# Patient Record
Sex: FEMALE | Race: WHITE | NOT HISPANIC OR LATINO | Employment: UNEMPLOYED | URBAN - METROPOLITAN AREA
[De-identification: names, ages, dates, MRNs, and addresses within clinical notes are randomized per-mention and may not be internally consistent; named-entity substitution may affect disease eponyms.]

---

## 2017-08-21 ENCOUNTER — APPOINTMENT (OUTPATIENT)
Dept: RADIOLOGY | Facility: MEDICAL CENTER | Age: 37
DRG: 694 | End: 2017-08-21
Attending: EMERGENCY MEDICINE
Payer: COMMERCIAL

## 2017-08-21 ENCOUNTER — HOSPITAL ENCOUNTER (INPATIENT)
Facility: MEDICAL CENTER | Age: 37
LOS: 3 days | DRG: 694 | End: 2017-08-24
Attending: EMERGENCY MEDICINE | Admitting: HOSPITALIST
Payer: COMMERCIAL

## 2017-08-21 ENCOUNTER — RESOLUTE PROFESSIONAL BILLING HOSPITAL PROF FEE (OUTPATIENT)
Dept: HOSPITALIST | Facility: MEDICAL CENTER | Age: 37
End: 2017-08-21
Payer: COMMERCIAL

## 2017-08-21 ENCOUNTER — APPOINTMENT (OUTPATIENT)
Dept: RADIOLOGY | Facility: MEDICAL CENTER | Age: 37
DRG: 694 | End: 2017-08-21
Attending: UROLOGY
Payer: COMMERCIAL

## 2017-08-21 DIAGNOSIS — N20.1 URETEROLITHIASIS: ICD-10-CM

## 2017-08-21 DIAGNOSIS — R82.81 PYURIA: ICD-10-CM

## 2017-08-21 DIAGNOSIS — R11.2 INTRACTABLE VOMITING WITH NAUSEA, UNSPECIFIED VOMITING TYPE: ICD-10-CM

## 2017-08-21 PROBLEM — D69.6 THROMBOCYTOPENIA (HCC): Status: ACTIVE | Noted: 2017-08-21

## 2017-08-21 PROBLEM — N13.30 HYDRONEPHROSIS: Status: ACTIVE | Noted: 2017-08-21

## 2017-08-21 PROBLEM — N39.0 UTI (URINARY TRACT INFECTION): Status: ACTIVE | Noted: 2017-08-21

## 2017-08-21 PROBLEM — E87.20 METABOLIC ACIDOSIS: Status: ACTIVE | Noted: 2017-08-21

## 2017-08-21 PROBLEM — N20.0 NEPHROLITHIASIS: Status: ACTIVE | Noted: 2017-08-21

## 2017-08-21 LAB
ALBUMIN SERPL BCP-MCNC: 4.5 G/DL (ref 3.2–4.9)
ALBUMIN/GLOB SERPL: 1.4 G/DL
ALP SERPL-CCNC: 51 U/L (ref 30–99)
ALT SERPL-CCNC: 43 U/L (ref 2–50)
ANION GAP SERPL CALC-SCNC: 14 MMOL/L (ref 0–11.9)
APPEARANCE UR: ABNORMAL
APPEARANCE UR: ABNORMAL
AST SERPL-CCNC: 53 U/L (ref 12–45)
BACTERIA #/AREA URNS HPF: ABNORMAL /HPF
BASOPHILS # BLD AUTO: 0.5 % (ref 0–1.8)
BASOPHILS # BLD: 0.03 K/UL (ref 0–0.12)
BILIRUB SERPL-MCNC: 0.6 MG/DL (ref 0.1–1.5)
BILIRUB UR QL STRIP.AUTO: ABNORMAL
BUN SERPL-MCNC: 13 MG/DL (ref 8–22)
CALCIUM SERPL-MCNC: 9.1 MG/DL (ref 8.4–10.2)
CHLORIDE SERPL-SCNC: 104 MMOL/L (ref 96–112)
CO2 SERPL-SCNC: 18 MMOL/L (ref 20–33)
COLOR UR: ABNORMAL
COLOR UR: YELLOW
CREAT SERPL-MCNC: 0.69 MG/DL (ref 0.5–1.4)
CULTURE IF INDICATED INDCX: YES UA CULTURE
EOSINOPHIL # BLD AUTO: 0.01 K/UL (ref 0–0.51)
EOSINOPHIL NFR BLD: 0.2 % (ref 0–6.9)
EPI CELLS #/AREA URNS HPF: ABNORMAL /HPF
ERYTHROCYTE [DISTWIDTH] IN BLOOD BY AUTOMATED COUNT: 43.5 FL (ref 35.9–50)
GFR SERPL CREATININE-BSD FRML MDRD: >60 ML/MIN/1.73 M 2
GLOBULIN SER CALC-MCNC: 3.3 G/DL (ref 1.9–3.5)
GLUCOSE SERPL-MCNC: 90 MG/DL (ref 65–99)
GLUCOSE UR STRIP.AUTO-MCNC: NEGATIVE MG/DL
GLUCOSE UR STRIP.AUTO-MCNC: NEGATIVE MG/DL
HCG SERPL QL: NEGATIVE
HCT VFR BLD AUTO: 38.4 % (ref 37–47)
HGB BLD-MCNC: 13.7 G/DL (ref 12–16)
IMM GRANULOCYTES # BLD AUTO: 0.02 K/UL (ref 0–0.11)
IMM GRANULOCYTES NFR BLD AUTO: 0.3 % (ref 0–0.9)
KETONES UR STRIP.AUTO-MCNC: 15 MG/DL
KETONES UR STRIP.AUTO-MCNC: 15 MG/DL
LEUKOCYTE ESTERASE UR QL STRIP.AUTO: ABNORMAL
LEUKOCYTE ESTERASE UR QL STRIP.AUTO: ABNORMAL
LIPASE SERPL-CCNC: 26 U/L (ref 7–58)
LYMPHOCYTES # BLD AUTO: 0.88 K/UL (ref 1–4.8)
LYMPHOCYTES NFR BLD: 14.5 % (ref 22–41)
MAGNESIUM SERPL-MCNC: 1.7 MG/DL (ref 1.5–2.5)
MCH RBC QN AUTO: 34.8 PG (ref 27–33)
MCHC RBC AUTO-ENTMCNC: 35.7 G/DL (ref 33.6–35)
MCV RBC AUTO: 97.5 FL (ref 81.4–97.8)
MICRO URNS: ABNORMAL
MONOCYTES # BLD AUTO: 0.32 K/UL (ref 0–0.85)
MONOCYTES NFR BLD AUTO: 5.3 % (ref 0–13.4)
MUCOUS THREADS #/AREA URNS HPF: ABNORMAL /HPF
NEUTROPHILS # BLD AUTO: 4.82 K/UL (ref 2–7.15)
NEUTROPHILS NFR BLD: 79.2 % (ref 44–72)
NITRITE UR QL STRIP.AUTO: NEGATIVE
NITRITE UR QL STRIP.AUTO: NEGATIVE
NRBC # BLD AUTO: 0 K/UL
NRBC BLD AUTO-RTO: 0 /100 WBC
PH UR STRIP.AUTO: 6.5 [PH]
PH UR STRIP.AUTO: 6.5 [PH]
PLATELET # BLD AUTO: 128 K/UL (ref 164–446)
PMV BLD AUTO: 8.8 FL (ref 9–12.9)
POTASSIUM SERPL-SCNC: 4.6 MMOL/L (ref 3.6–5.5)
PROT SERPL-MCNC: 7.8 G/DL (ref 6–8.2)
PROT UR QL STRIP: 100 MG/DL
PROT UR QL STRIP: 100 MG/DL
RBC # BLD AUTO: 3.94 M/UL (ref 4.2–5.4)
RBC # URNS HPF: ABNORMAL /HPF
RBC UR QL AUTO: ABNORMAL
RBC UR QL AUTO: ABNORMAL
SODIUM SERPL-SCNC: 136 MMOL/L (ref 135–145)
SP GR UR STRIP.AUTO: 1.02
SP GR UR STRIP.AUTO: >=1.03
TSH SERPL DL<=0.005 MIU/L-ACNC: 1.14 UIU/ML (ref 0.35–5.5)
WBC # BLD AUTO: 6.1 K/UL (ref 4.8–10.8)
WBC #/AREA URNS HPF: ABNORMAL /HPF

## 2017-08-21 PROCEDURE — 85025 COMPLETE CBC W/AUTO DIFF WBC: CPT

## 2017-08-21 PROCEDURE — 700105 HCHG RX REV CODE 258: Performed by: HOSPITALIST

## 2017-08-21 PROCEDURE — 700111 HCHG RX REV CODE 636 W/ 250 OVERRIDE (IP)

## 2017-08-21 PROCEDURE — 160048 HCHG OR STATISTICAL LEVEL 1-5: Performed by: UROLOGY

## 2017-08-21 PROCEDURE — 501329 HCHG SET, CYSTO IRRIG Y TUR: Performed by: UROLOGY

## 2017-08-21 PROCEDURE — 84703 CHORIONIC GONADOTROPIN ASSAY: CPT

## 2017-08-21 PROCEDURE — 502240 HCHG MISC OR SUPPLY RC 0272: Performed by: UROLOGY

## 2017-08-21 PROCEDURE — C2617 STENT, NON-COR, TEM W/O DEL: HCPCS | Performed by: UROLOGY

## 2017-08-21 PROCEDURE — 96376 TX/PRO/DX INJ SAME DRUG ADON: CPT

## 2017-08-21 PROCEDURE — A9270 NON-COVERED ITEM OR SERVICE: HCPCS | Performed by: UROLOGY

## 2017-08-21 PROCEDURE — 160039 HCHG SURGERY MINUTES - EA ADDL 1 MIN LEVEL 3: Performed by: UROLOGY

## 2017-08-21 PROCEDURE — 74000 DX-ABDOMEN-1 VIEW: CPT

## 2017-08-21 PROCEDURE — 83690 ASSAY OF LIPASE: CPT

## 2017-08-21 PROCEDURE — 36415 COLL VENOUS BLD VENIPUNCTURE: CPT

## 2017-08-21 PROCEDURE — 700101 HCHG RX REV CODE 250

## 2017-08-21 PROCEDURE — 74176 CT ABD & PELVIS W/O CONTRAST: CPT

## 2017-08-21 PROCEDURE — 700105 HCHG RX REV CODE 258: Performed by: STUDENT IN AN ORGANIZED HEALTH CARE EDUCATION/TRAINING PROGRAM

## 2017-08-21 PROCEDURE — 700111 HCHG RX REV CODE 636 W/ 250 OVERRIDE (IP): Performed by: EMERGENCY MEDICINE

## 2017-08-21 PROCEDURE — 80053 COMPREHEN METABOLIC PANEL: CPT

## 2017-08-21 PROCEDURE — 81001 URINALYSIS AUTO W/SCOPE: CPT

## 2017-08-21 PROCEDURE — 99285 EMERGENCY DEPT VISIT HI MDM: CPT

## 2017-08-21 PROCEDURE — 700117 HCHG RX CONTRAST REV CODE 255

## 2017-08-21 PROCEDURE — 770006 HCHG ROOM/CARE - MED/SURG/GYN SEMI*

## 2017-08-21 PROCEDURE — 160035 HCHG PACU - 1ST 60 MINS PHASE I: Performed by: UROLOGY

## 2017-08-21 PROCEDURE — 83735 ASSAY OF MAGNESIUM: CPT

## 2017-08-21 PROCEDURE — 700102 HCHG RX REV CODE 250 W/ 637 OVERRIDE(OP): Performed by: HOSPITALIST

## 2017-08-21 PROCEDURE — 87086 URINE CULTURE/COLONY COUNT: CPT

## 2017-08-21 PROCEDURE — 0T778DZ DILATION OF LEFT URETER WITH INTRALUMINAL DEVICE, VIA NATURAL OR ARTIFICIAL OPENING ENDOSCOPIC: ICD-10-PCS | Performed by: UROLOGY

## 2017-08-21 PROCEDURE — 160002 HCHG RECOVERY MINUTES (STAT): Performed by: UROLOGY

## 2017-08-21 PROCEDURE — A9270 NON-COVERED ITEM OR SERVICE: HCPCS | Performed by: HOSPITALIST

## 2017-08-21 PROCEDURE — 160009 HCHG ANES TIME/MIN: Performed by: UROLOGY

## 2017-08-21 PROCEDURE — A4357 BEDSIDE DRAINAGE BAG: HCPCS | Performed by: UROLOGY

## 2017-08-21 PROCEDURE — 96375 TX/PRO/DX INJ NEW DRUG ADDON: CPT

## 2017-08-21 PROCEDURE — 700111 HCHG RX REV CODE 636 W/ 250 OVERRIDE (IP): Performed by: HOSPITALIST

## 2017-08-21 PROCEDURE — C1769 GUIDE WIRE: HCPCS | Performed by: UROLOGY

## 2017-08-21 PROCEDURE — 96365 THER/PROPH/DIAG IV INF INIT: CPT

## 2017-08-21 PROCEDURE — 500880 HCHG PACK, CYSTO W/SEP LEGGINGS: Performed by: UROLOGY

## 2017-08-21 PROCEDURE — 81002 URINALYSIS NONAUTO W/O SCOPE: CPT

## 2017-08-21 PROCEDURE — 700102 HCHG RX REV CODE 250 W/ 637 OVERRIDE(OP): Performed by: UROLOGY

## 2017-08-21 PROCEDURE — 94760 N-INVAS EAR/PLS OXIMETRY 1: CPT

## 2017-08-21 PROCEDURE — 700105 HCHG RX REV CODE 258: Performed by: EMERGENCY MEDICINE

## 2017-08-21 PROCEDURE — 99223 1ST HOSP IP/OBS HIGH 75: CPT | Performed by: HOSPITALIST

## 2017-08-21 PROCEDURE — 160028 HCHG SURGERY MINUTES - 1ST 30 MINS LEVEL 3: Performed by: UROLOGY

## 2017-08-21 PROCEDURE — 84443 ASSAY THYROID STIM HORMONE: CPT

## 2017-08-21 DEVICE — STENT UROLOGICAL POLARIS 6X24  ULTRA: Type: IMPLANTABLE DEVICE | Site: URETER | Status: FUNCTIONAL

## 2017-08-21 RX ORDER — SODIUM CHLORIDE, SODIUM LACTATE, POTASSIUM CHLORIDE, CALCIUM CHLORIDE 600; 310; 30; 20 MG/100ML; MG/100ML; MG/100ML; MG/100ML
1000 INJECTION, SOLUTION INTRAVENOUS
Status: DISCONTINUED | OUTPATIENT
Start: 2017-08-21 | End: 2017-08-22

## 2017-08-21 RX ORDER — BISACODYL 10 MG
10 SUPPOSITORY, RECTAL RECTAL
Status: DISCONTINUED | OUTPATIENT
Start: 2017-08-21 | End: 2017-08-24 | Stop reason: HOSPADM

## 2017-08-21 RX ORDER — CEFTRIAXONE 1 G/1
1 INJECTION, POWDER, FOR SOLUTION INTRAMUSCULAR; INTRAVENOUS ONCE
Status: COMPLETED | OUTPATIENT
Start: 2017-08-21 | End: 2017-08-21

## 2017-08-21 RX ORDER — KETOROLAC TROMETHAMINE 30 MG/ML
30 INJECTION, SOLUTION INTRAMUSCULAR; INTRAVENOUS ONCE
Status: COMPLETED | OUTPATIENT
Start: 2017-08-21 | End: 2017-08-21

## 2017-08-21 RX ORDER — ONDANSETRON 2 MG/ML
4 INJECTION INTRAMUSCULAR; INTRAVENOUS ONCE
Status: COMPLETED | OUTPATIENT
Start: 2017-08-21 | End: 2017-08-21

## 2017-08-21 RX ORDER — PROMETHAZINE HYDROCHLORIDE 25 MG/1
12.5-25 TABLET ORAL EVERY 4 HOURS PRN
Status: DISCONTINUED | OUTPATIENT
Start: 2017-08-21 | End: 2017-08-24 | Stop reason: HOSPADM

## 2017-08-21 RX ORDER — ONDANSETRON 4 MG/1
4 TABLET, ORALLY DISINTEGRATING ORAL EVERY 4 HOURS PRN
Status: DISCONTINUED | OUTPATIENT
Start: 2017-08-21 | End: 2017-08-24 | Stop reason: HOSPADM

## 2017-08-21 RX ORDER — AMOXICILLIN 250 MG
2 CAPSULE ORAL 2 TIMES DAILY
Status: DISCONTINUED | OUTPATIENT
Start: 2017-08-21 | End: 2017-08-24 | Stop reason: HOSPADM

## 2017-08-21 RX ORDER — ACETAMINOPHEN 325 MG/1
650 TABLET ORAL EVERY 6 HOURS PRN
Status: DISCONTINUED | OUTPATIENT
Start: 2017-08-21 | End: 2017-08-24 | Stop reason: HOSPADM

## 2017-08-21 RX ORDER — TAMSULOSIN HYDROCHLORIDE 0.4 MG/1
0.4 CAPSULE ORAL
Status: DISCONTINUED | OUTPATIENT
Start: 2017-08-21 | End: 2017-08-24 | Stop reason: HOSPADM

## 2017-08-21 RX ORDER — SODIUM CHLORIDE 9 MG/ML
INJECTION, SOLUTION INTRAVENOUS CONTINUOUS
Status: DISCONTINUED | OUTPATIENT
Start: 2017-08-21 | End: 2017-08-22

## 2017-08-21 RX ORDER — LABETALOL HYDROCHLORIDE 5 MG/ML
10 INJECTION, SOLUTION INTRAVENOUS EVERY 4 HOURS PRN
Status: DISCONTINUED | OUTPATIENT
Start: 2017-08-21 | End: 2017-08-24 | Stop reason: HOSPADM

## 2017-08-21 RX ORDER — PROMETHAZINE HYDROCHLORIDE 25 MG/1
12.5-25 SUPPOSITORY RECTAL EVERY 4 HOURS PRN
Status: DISCONTINUED | OUTPATIENT
Start: 2017-08-21 | End: 2017-08-24 | Stop reason: HOSPADM

## 2017-08-21 RX ORDER — LORAZEPAM 0.5 MG/1
0.5 TABLET ORAL EVERY 6 HOURS PRN
Status: DISCONTINUED | OUTPATIENT
Start: 2017-08-21 | End: 2017-08-24 | Stop reason: HOSPADM

## 2017-08-21 RX ORDER — LORAZEPAM 2 MG/ML
0.5 INJECTION INTRAMUSCULAR EVERY 6 HOURS PRN
Status: DISCONTINUED | OUTPATIENT
Start: 2017-08-21 | End: 2017-08-24 | Stop reason: HOSPADM

## 2017-08-21 RX ORDER — TEMAZEPAM 15 MG/1
15 CAPSULE ORAL
Status: DISCONTINUED | OUTPATIENT
Start: 2017-08-21 | End: 2017-08-24 | Stop reason: HOSPADM

## 2017-08-21 RX ORDER — SODIUM CHLORIDE 9 MG/ML
1000 INJECTION, SOLUTION INTRAVENOUS ONCE
Status: COMPLETED | OUTPATIENT
Start: 2017-08-21 | End: 2017-08-21

## 2017-08-21 RX ORDER — OXYCODONE HYDROCHLORIDE 5 MG/1
5 TABLET ORAL EVERY 4 HOURS PRN
Status: DISCONTINUED | OUTPATIENT
Start: 2017-08-21 | End: 2017-08-24 | Stop reason: HOSPADM

## 2017-08-21 RX ORDER — POLYETHYLENE GLYCOL 3350 17 G/17G
1 POWDER, FOR SOLUTION ORAL
Status: DISCONTINUED | OUTPATIENT
Start: 2017-08-21 | End: 2017-08-24 | Stop reason: HOSPADM

## 2017-08-21 RX ORDER — ONDANSETRON 2 MG/ML
4 INJECTION INTRAMUSCULAR; INTRAVENOUS EVERY 4 HOURS PRN
Status: DISCONTINUED | OUTPATIENT
Start: 2017-08-21 | End: 2017-08-24 | Stop reason: HOSPADM

## 2017-08-21 RX ADMIN — HYDROMORPHONE HYDROCHLORIDE 1 MG: 1 INJECTION, SOLUTION INTRAMUSCULAR; INTRAVENOUS; SUBCUTANEOUS at 12:46

## 2017-08-21 RX ADMIN — SODIUM CHLORIDE: 9 INJECTION, SOLUTION INTRAVENOUS at 13:30

## 2017-08-21 RX ADMIN — ONDANSETRON 4 MG: 2 INJECTION INTRAMUSCULAR; INTRAVENOUS at 11:08

## 2017-08-21 RX ADMIN — PROCHLORPERAZINE EDISYLATE 5 MG: 5 INJECTION INTRAMUSCULAR; INTRAVENOUS at 16:10

## 2017-08-21 RX ADMIN — SODIUM CHLORIDE 1000 ML: 9 INJECTION, SOLUTION INTRAVENOUS at 11:07

## 2017-08-21 RX ADMIN — SODIUM CHLORIDE: 9 INJECTION, SOLUTION INTRAVENOUS at 19:48

## 2017-08-21 RX ADMIN — PIPERACILLIN SODIUM,TAZOBACTAM SODIUM 4.5 G: 4; .5 INJECTION, POWDER, FOR SOLUTION INTRAVENOUS at 19:48

## 2017-08-21 RX ADMIN — PIPERACILLIN SODIUM,TAZOBACTAM SODIUM 4.5 G: 4; .5 INJECTION, POWDER, FOR SOLUTION INTRAVENOUS at 13:54

## 2017-08-21 RX ADMIN — ONDANSETRON 4 MG: 2 INJECTION INTRAMUSCULAR; INTRAVENOUS at 13:54

## 2017-08-21 RX ADMIN — TAMSULOSIN HYDROCHLORIDE 0.4 MG: 0.4 CAPSULE ORAL at 20:59

## 2017-08-21 RX ADMIN — STANDARDIZED SENNA CONCENTRATE AND DOCUSATE SODIUM 2 TABLET: 8.6; 5 TABLET, FILM COATED ORAL at 20:58

## 2017-08-21 RX ADMIN — FENTANYL CITRATE 50 MCG: 50 INJECTION, SOLUTION INTRAMUSCULAR; INTRAVENOUS at 19:57

## 2017-08-21 RX ADMIN — SODIUM CHLORIDE, POTASSIUM CHLORIDE, SODIUM LACTATE AND CALCIUM CHLORIDE 1000 ML: 600; 310; 30; 20 INJECTION, SOLUTION INTRAVENOUS at 17:00

## 2017-08-21 RX ADMIN — FENTANYL CITRATE 50 MCG: 50 INJECTION, SOLUTION INTRAMUSCULAR; INTRAVENOUS at 14:52

## 2017-08-21 RX ADMIN — OXYCODONE HYDROCHLORIDE 5 MG: 5 TABLET ORAL at 16:56

## 2017-08-21 RX ADMIN — KETOROLAC TROMETHAMINE 30 MG: 30 INJECTION, SOLUTION INTRAMUSCULAR at 11:07

## 2017-08-21 RX ADMIN — CEFTRIAXONE 1 G: 1 INJECTION, POWDER, FOR SOLUTION INTRAMUSCULAR; INTRAVENOUS at 12:46

## 2017-08-21 RX ADMIN — FENTANYL CITRATE 50 MCG: 50 INJECTION, SOLUTION INTRAMUSCULAR; INTRAVENOUS at 16:19

## 2017-08-21 RX ADMIN — OXYCODONE HYDROCHLORIDE 5 MG: 5 TABLET ORAL at 20:58

## 2017-08-21 RX ADMIN — FENTANYL CITRATE 50 MCG: 50 INJECTION, SOLUTION INTRAMUSCULAR; INTRAVENOUS at 13:29

## 2017-08-21 RX ADMIN — HYDROMORPHONE HYDROCHLORIDE 1 MG: 1 INJECTION, SOLUTION INTRAMUSCULAR; INTRAVENOUS; SUBCUTANEOUS at 11:08

## 2017-08-21 ASSESSMENT — PAIN SCALES - GENERAL
PAINLEVEL_OUTOF10: 0
PAINLEVEL_OUTOF10: 4
PAINLEVEL_OUTOF10: 4
PAINLEVEL_OUTOF10: 0
PAINLEVEL_OUTOF10: 6
PAINLEVEL_OUTOF10: 0
PAINLEVEL_OUTOF10: 7
PAINLEVEL_OUTOF10: 10
PAINLEVEL_OUTOF10: 10
PAINLEVEL_OUTOF10: 0
PAINLEVEL_OUTOF10: 4
PAINLEVEL_OUTOF10: 10

## 2017-08-21 NOTE — PROGRESS NOTES
Pt to floor.  Pt in visible pain, 10/10 left flank pain, will medicated per MAR. Updated pt w/ POC. Will continue with care.

## 2017-08-21 NOTE — ASSESSMENT & PLAN NOTE
-urology signed off yesterday; stent the previous night, plan for f/u in 2 weeks  -as patient is still in significant discomfort and ucx negative, urology back on case and plans treatment this pm

## 2017-08-21 NOTE — ED PROVIDER NOTES
ED Provider Note    CHIEF COMPLAINT  Chief Complaint   Patient presents with   • Flank Pain     left       HPI  Radha Combs is a 37 y.o. female who presents to emergency room with a chief complaint of flank pain. The patient presents today with severe left-sided flank pain. She says this is the worst pain of her life. She developed feelings similar to a urinary tract infection over the weekend. She took a Z-Roshan opening that this would help. Yesterday she felt fine. She went shopping and ran [employer] without any difficulty. This morning the patient developed severe sharp stabbing cramping left-sided flank pain associated with nausea and vomiting. She denies any fevers.    REVIEW OF SYSTEMS  See HPI for further details. All other systems are negative.     PAST MEDICAL HISTORY  Past Medical History   Diagnosis Date   • Anemia    • Back pain 1996   • Bladder infection 2003   • Blood transfusion 1985   • Kidney disease 1985   • Migraine    • Gynecological disorder      painful periods; irregular periods   • ASTHMA      history of as a teenager       FAMILY HISTORY  Family History   Problem Relation Age of Onset   • Allergies Mother    • Allergies Sister    • Cancer Maternal Grandmother    • Lung Disease Paternal Grandfather        SOCIAL HISTORY  Social History     Social History   • Marital Status:      Spouse Name: N/A   • Number of Children: N/A   • Years of Education: N/A     Social History Main Topics   • Smoking status: Never Smoker    • Smokeless tobacco: Never Used   • Alcohol Use: Yes      Comment: occasional wine   • Drug Use: No   • Sexual Activity:     Partners: Male     Other Topics Concern   • None     Social History Narrative       SURGICAL HISTORY  Past Surgical History   Procedure Laterality Date   • Other       wisdom teeth   • Pelviscopy Bilateral 2/9/2016     Procedure: PELVISCOPY exploratory with bilateral saplingectomy ;  Surgeon: Yarely Bishop M.D.;  Location: SURGERY SAME  "DAY [facility] ORS;  Service:    • Hysteroscopy thermal ablation N/A 2/9/2016     Procedure: HYSTEROSCOPY hydro THERMAL ABLATION;  Surgeon: Yarely Bishop M.D.;  Location: SURGERY SAME DAY [facility] ORS;  Service:        CURRENT MEDICATIONS  Home Medications     **Home medications have not yet been reviewed for this encounter**          ALLERGIES  Allergies   Allergen Reactions   • Shellfish Allergy Vomiting   • Codeine Vomiting       PHYSICAL EXAM  VITAL SIGNS: /87 mmHg  Pulse 102  Temp(Src) 36.7 °C (98 °F)  Resp 16  Ht 1.676 m (5' 5.98\")  Wt 53.4 kg (117 lb 11.6 oz)  BMI 19.01 kg/m2  SpO2 97%  LMP 07/31/2017  Breastfeeding? No  Constitutional: Well developed, Well nourished, severe distress actively retching, Non-toxic appearance.   HENT: Normocephalic, Atraumatic, Bilateral external ears normal, Oropharynx moist, No oral exudates, Nose normal.   Eyes: PERRL, EOMI, Conjunctiva normal, No discharge.   Neck: Normal range of motion, No tenderness, Supple, No stridor.   Lymphatic: No lymphadenopathy noted.   Cardiovascular: Normal heart rate, Normal rhythm, No murmurs, No rubs, No gallops.   Thorax & Lungs: Normal breath sounds, No respiratory distress, No wheezing, No chest tenderness.   Abdomen: Left CVA tenderness to palpation, no anterior abdominal tenderness to palpation, soft, no peritoneal signs.   Skin: Warm, Dry, No erythema, No rash.   Back: No tenderness, No CVA tenderness.   Extremities: Intact distal pulses, No edema, No tenderness, No cyanosis, No clubbing.   Neurologic: Alert & oriented x 3, Normal motor function, Normal sensory function, No focal deficits noted.       RADIOLOGY/PROCEDURES  CT-RENAL COLIC EVALUATION(A/P W/O)   Final Result      Left proximal ureteral 5 mm nephrolith causes moderate hydroureteronephrosis and of the adjacent fat stranding      4 mm nonobstructing left nephroliths      Severe hepatic steatosis        Results for orders placed or performed during the " hospital encounter of 08/21/17   URINALYSIS,CULTURE IF INDICATED   Result Value Ref Range    Micro Urine Req Microscopic     Color Yellow     Character Hazy (A)     Specific Gravity 1.025 <1.035    Ph 6.5 5.0-8.0    Glucose Negative Negative mg/dL    Ketones 15 (A) Negative mg/dL    Protein 100 (A) Negative mg/dL    Bilirubin Small (A) Negative    Nitrite Negative Negative    Leukocyte Esterase Trace (A) Negative    Occult Blood Large (A) Negative    Culture Indicated Yes UA Culture   COMP METABOLIC PANEL   Result Value Ref Range    Sodium 136 135 - 145 mmol/L    Potassium 4.6 3.6 - 5.5 mmol/L    Chloride 104 96 - 112 mmol/L    Co2 18 (L) 20 - 33 mmol/L    Anion Gap 14.0 (H) 0.0 - 11.9    Glucose 90 65 - 99 mg/dL    Bun 13 8 - 22 mg/dL    Creatinine 0.69 0.50 - 1.40 mg/dL    Calcium 9.1 8.4 - 10.2 mg/dL    AST(SGOT) 53 (H) 12 - 45 U/L    ALT(SGPT) 43 2 - 50 U/L    Alkaline Phosphatase 51 30 - 99 U/L    Total Bilirubin 0.6 0.1 - 1.5 mg/dL    Albumin 4.5 3.2 - 4.9 g/dL    Total Protein 7.8 6.0 - 8.2 g/dL    Globulin 3.3 1.9 - 3.5 g/dL    A-G Ratio 1.4 g/dL   LIPASE   Result Value Ref Range    Lipase 26 7 - 58 U/L   HCG QUAL SERUM   Result Value Ref Range    Beta-Hcg Qualitative Serum Negative Negative   CBC WITH DIFFERENTIAL   Result Value Ref Range    WBC 6.1 4.8 - 10.8 K/uL    RBC 3.94 (L) 4.20 - 5.40 M/uL    Hemoglobin 13.7 12.0 - 16.0 g/dL    Hematocrit 38.4 37.0 - 47.0 %    MCV 97.5 81.4 - 97.8 fL    MCH 34.8 (H) 27.0 - 33.0 pg    MCHC 35.7 (H) 33.6 - 35.0 g/dL    RDW 43.5 35.9 - 50.0 fL    Platelet Count 128 (L) 164 - 446 K/uL    MPV 8.8 (L) 9.0 - 12.9 fL    Neutrophils-Polys 79.20 (H) 44.00 - 72.00 %    Lymphocytes 14.50 (L) 22.00 - 41.00 %    Monocytes 5.30 0.00 - 13.40 %    Eosinophils 0.20 0.00 - 6.90 %    Basophils 0.50 0.00 - 1.80 %    Immature Granulocytes 0.30 0.00 - 0.90 %    Nucleated RBC 0.00 /100 WBC    Neutrophils (Absolute) 4.82 2.00 - 7.15 K/uL    Lymphs (Absolute) 0.88 (L) 1.00 - 4.80 K/uL     Monos (Absolute) 0.32 0.00 - 0.85 K/uL    Eos (Absolute) 0.01 0.00 - 0.51 K/uL    Baso (Absolute) 0.03 0.00 - 0.12 K/uL    Immature Granulocytes (abs) 0.02 0.00 - 0.11 K/uL    NRBC (Absolute) 0.00 K/uL   URINE MICROSCOPIC (W/UA)   Result Value Ref Range    WBC 5-10 (A) /hpf    -150 (A) /hpf    Bacteria Few (A) None /hpf    Epithelial Cells Moderate (A) Few /hpf    Mucous Threads Many /hpf   ESTIMATED GFR   Result Value Ref Range    GFR If African American >60 >60 mL/min/1.73 m 2    GFR If Non African American >60 >60 mL/min/1.73 m 2   POC UA   Result Value Ref Range    POC Color Laura     POC Appearance Cloudy (A)     POC Glucose Negative Negative mg/dL    POC Ketones 15 (A) Negative mg/dL    POC Specific Gravity >=1.030 (A) 1.005-1.030    POC Blood Large (A) Negative    POC Urine PH 6.5 5.0-8.0    POC Protein 100 (A) Negative mg/dL    POC Nitrites Negative Negative    POC Leukocyte Esterase Trace (A) Negative         COURSE & MEDICAL DECISION MAKING  Pertinent Labs & Imaging studies reviewed. (See chart for details)    The patient presents today with several days of feeling she had a urinary tract infection but she developed severe flank pain this morning. CT scan confirms ureterolithiasis with a proximal 5 mm ureteral stone. Urinalysis is remarkable for 100-150 red blood cells and 5-10 white blood cells. Nitrite negative. Peripheral blood cell count is not elevated. The patient is afebrile.    In the emergency room and the patient's pain has been controlled with hydromorphone and Toradol. However she requires repeated doses of medications to keep her comfortable. She'll be admitted the hospital for treatment of her intractable pain.    The patient has intractable flank pain related to her kidney stone. There is possibility of infection. The patient will be treated with ceftriaxone. I spoke with the on-call urologist Dr. Hauser will consult. The patient will be admitted to the hospital by the on-call  hospitalist Dr. Her.      FINAL IMPRESSION  1. Ureterolithiasis    2. Pyuria    3. Intractable vomiting with nausea, unspecified vomiting type            Electronically signed by: Brad Bush, 8/21/2017 12:49 PM

## 2017-08-21 NOTE — IP AVS SNAPSHOT
" <p align=\"LEFT\"><IMG SRC=\"//[email or web address]\" alt=\"Image\" WIDTH=\"50%\" HEIGHT=\"200\" BORDER=\"\"></p>                   Name:Radha Combs  Medical Record Number:[id number]  CSN: [id number]    YOB: 1980   Age: 37 y.o.  Sex: female  HT:1.676 m (5' 5.98\") WT: 53.4 kg (117 lb 11.6 oz)          Admit Date: 8/21/2017     Discharge Date:   Today's Date: 8/24/2017  Attending Doctor:  Ingrid Benson M.D.                  Allergies:  Shellfish allergy and Codeine          Your appointments     Aug 28, 2017 10:00 AM   CARE MANAGER TELEPHONE VISIT with CARE MANAGER   [address] [id number] [phone]           ***IMPORTANT**** This is a phone visit only. Do not come into the office. The Care Manager will call you at the scheduled time for Care Manager Telephone Visit.            Aug 30, 2017  9:20 AM   Established Patient with Susana Pete M.D.   [address]  [place] [id number] [phone]           You will be receiving a confirmation call a few days before your appointment from our automated call confirmation system.              Follow-up Information     1. Follow up with Aj Albright M.D.. Schedule an appointment as soon as possible for a visit in 2 weeks.    Specialty:  Urology    Contact information    [address] [place] [address]  [phone]           Medication List      Notice     You have not been prescribed any medications.      "

## 2017-08-21 NOTE — IP AVS SNAPSHOT
" Home Care Instructions                                                                                                                  Name:Radha Combs  Medical Record Number:[id number]  CSN: [id number]    YOB: 1980   Age: 37 y.o.  Sex: female  HT:1.676 m (5' 5.98\") WT: 53.4 kg (117 lb 11.6 oz)          Admit Date: 8/21/2017     Discharge Date:   Today's Date: 8/24/2017  Attending Doctor:  Ingrid Benson M.D.                  Allergies:  Shellfish allergy and Codeine            Discharge Instructions       Kidney Stones  Kidney stones (urolithiasis) are deposits that form inside your kidneys. The intense pain is caused by the stone moving through the urinary tract. When the stone moves, the ureter goes into spasm around the stone. The stone is usually passed in the urine.   CAUSES   · A disorder that makes certain neck glands produce too much parathyroid hormone (primary hyperparathyroidism).  · A buildup of uric acid crystals, similar to gout in your joints.  · Narrowing (stricture) of the ureter.  · A kidney obstruction present at birth (congenital obstruction).  · Previous surgery on the kidney or ureters.  · Numerous kidney infections.  SYMPTOMS   1. Feeling sick to your stomach (nauseous).  2. Throwing up (vomiting).  3. Blood in the urine (hematuria).  4. Pain that usually spreads (radiates) to the groin.  5. Frequency or urgency of urination.  DIAGNOSIS   1. Taking a history and physical exam.  2. Blood or urine tests.  3. CT scan.  4. Occasionally, an examination of the inside of the urinary bladder (cystoscopy) is performed.  TREATMENT   1. Observation.  2. Increasing your fluid intake.  3. Extracorporeal shock wave lithotripsy--This is a noninvasive procedure that uses shock waves to break up kidney stones.  4. Surgery may be needed if you have severe pain or persistent obstruction. There are various surgical procedures. Most of the procedures are performed with the use of small " instruments. Only small incisions are needed to accommodate these instruments, so recovery time is minimized.  The size, location, and chemical composition are all important variables that will determine the proper choice of action for you. Talk to your health care provider to better understand your situation so that you will minimize the risk of injury to yourself and your kidney.   HOME CARE INSTRUCTIONS   1. Drink enough water and fluids to keep your urine clear or pale yellow. This will help you to pass the stone or stone fragments.  2. Strain all urine through the provided strainer. Keep all particulate matter and stones for your health care provider to see. The stone causing the pain may be as small as a grain of salt. It is very important to use the strainer each and every time you pass your urine. The collection of your stone will allow your health care provider to analyze it and verify that a stone has actually passed. The stone analysis will often identify what you can do to reduce the incidence of recurrences.  3. Only take over-the-counter or prescription medicines for pain, discomfort, or fever as directed by your health care provider.  4. Make a follow-up appointment with your health care provider as directed.  5. Get follow-up X-rays if required. The absence of pain does not always mean that the stone has passed. It may have only stopped moving. If the urine remains completely obstructed, it can cause loss of kidney function or even complete destruction of the kidney. It is your responsibility to make sure X-rays and follow-ups are completed. Ultrasounds of the kidney can show blockages and the status of the kidney. Ultrasounds are not associated with any radiation and can be performed easily in a matter of minutes.  SEEK MEDICAL CARE IF:  · You experience pain that is progressive and unresponsive to any pain medicine you have been prescribed.  SEEK IMMEDIATE MEDICAL CARE IF:   · Pain cannot be  controlled with the prescribed medicine.  · You have a fever or shaking chills.  · The severity or intensity of pain increases over 18 hours and is not relieved by pain medicine.  · You develop a new onset of abdominal pain.  · You feel faint or pass out.  · You are unable to urinate.  MAKE SURE YOU:   · Understand these instructions.  · Will watch your condition.  · Will get help right away if you are not doing well or get worse.     This information is not intended to replace advice given to you by your health care provider. Make sure you discuss any questions you have with your health care provider.     Document Released: 12/18/2006 Document Revised: 01/08/2016 Document Reviewed: 05/21/2014  [employer] Interactive Patient Education ©2016 [employer]    Ureteral Stent Implantation, Care After  Refer to this sheet in the next few weeks. These instructions provide you with information on caring for yourself after your procedure. Your health care provider may also give you more specific instructions. Your treatment has been planned according to current medical practices, but problems sometimes occur. Call your health care provider if you have any problems or questions after your procedure.  WHAT TO EXPECT AFTER THE PROCEDURE  You should be back to normal activity within 48 hours after the procedure. Nausea and vomiting may occur and are commonly the result of anesthesia.  It is common to experience sharp pain in the back or lower abdomen and penis with voiding. This is caused by movement of the ends of the stent with the act of urinating. It usually goes away within minutes after you have stopped urinating.  HOME CARE INSTRUCTIONS  Make sure to drink plenty of fluids. You may have small amounts of bleeding, causing your urine to be red. This is normal. Certain movements may trigger pain or a feeling that you need to urinate. You may be given medicines to prevent infection or bladder spasms. Be sure to take all  medicines as directed. Only take over-the-counter or prescription medicines for pain, discomfort, or fever as directed by your health care provider. Do not take aspirin, as this can make bleeding worse.  Your stent will be left in until the blockage is resolved. This may take 2 weeks or longer, depending on the reason for stent implantation. You may have an X-ray exam to make sure your ureter is open and that the stent has not moved out of position (migrated). The stent can be removed by your health care provider in the office. Medicines may be given for comfort while the stent is being removed. Be sure to keep all follow-up appointments so your health care provider can check that you are healing properly.  SEEK MEDICAL CARE IF:  · You experience increasing pain.  · Your pain medicine is not working.  SEEK IMMEDIATE MEDICAL CARE IF:  6. Your urine is dark red or has blood clots.  7. You are leaking urine (incontinent).  8. You have a fever, chills, feeling sick to your stomach (nausea), or vomiting.  9. Your pain is not relieved by pain medicine.  10. The end of the stent comes out of the urethra.  11. You are unable to urinate.     This information is not intended to replace advice given to you by your health care provider. Make sure you discuss any questions you have with your health care provider.     Document Released: 08/20/2014 Document Revised: 12/23/2014 Document Reviewed: 08/20/2014  [employer] Interactive Patient Education ©2016 [employer]    Urine Strainer  This strainer is used to catch or filter out any stones found in your urine. Place the strainer under your urine stream. Save any stones or objects that you find in your urine. Place them in a plastic or glass container to show your caregiver. The stones vary in size - some can be very small, so make sure you check the strainer carefully. Your caregiver may send the stone to the lab. When the results are back, your caregiver may recommend medicines or  diet changes.   Document Released: 09/22/2005 Document Revised: 03/11/2013 Document Reviewed: 10/30/2009  [employer]® Patient Information ©2014 [employer].    F/U with Dr. Urias in next couple weeks, can go to post dc clinic   Return to ER if any significant hematuria, fever, increased flank pain, trouble urinating  Discharge Instructions    Discharged to home by car with relative. Discharged via wheelchair, hospital escort: Yes.  Special equipment needed: Not Applicable    Be sure to schedule a follow-up appointment with your primary care doctor or any specialists as instructed.     Discharge Plan:   Diet Plan: Discussed  Activity Level: Discussed  Confirmed Follow up Appointment: Patient to Call and Schedule Appointment  Confirmed Symptoms Management: Discussed  Medication Reconciliation Updated: Yes    I understand that a diet low in cholesterol, fat, and sodium is recommended for good health. Unless I have been given specific instructions below for another diet, I accept this instruction as my diet prescription.   Other diet: REGULAR    Special Instructions: None    · Is patient discharged on Warfarin / Coumadin?   No     · Is patient Post Blood Transfusion?  No    Depression / Suicide Risk    As you are discharged from this [facility] facility, it is important to learn how to keep safe from harming yourself.    Recognize the warning signs:  · Abrupt changes in personality, positive or negative- including increase in energy   · Giving away possessions  · Change in eating patterns- significant weight changes-  positive or negative  · Change in sleeping patterns- unable to sleep or sleeping all the time   · Unwillingness or inability to communicate  · Depression  · Unusual sadness, discouragement and loneliness  · Talk of wanting to die  · Neglect of personal appearance   · Rebelliousness- reckless behavior  · Withdrawal from people/activities they love  · Confusion- inability to concentrate     If you or a  loved one observes any of these behaviors or has concerns about self-harm, here's what you can do:  · Talk about it- your feelings and reasons for harming yourself  · Remove any means that you might use to hurt yourself (examples: pills, rope, extension cords, firearm)  · Get professional help from the community (Mental Health, Substance Abuse, psychological counseling)  · Do not be alone:Call your Safe Contact- someone whom you trust who will be there for you.  · Call your local CRISIS HOTLINE 752-4068 or 652-534-2619  · Call your local Children's Mobile Crisis Response Team [place] (992) 828-6941 or www.DermApproved  · Call the toll free National Suicide Prevention Hotlines   · National Suicide Prevention Lifeline 217-820-SCUA (3193)  · [place] [facility] 800-SUICIDE (156-6183)        Your appointments     Aug 28, 2017 10:00 AM   CARE MANAGER TELEPHONE VISIT with CARE MANAGER   [address] [address] [phone]           ***IMPORTANT**** This is a phone visit only. Do not come into the office. The Care Manager will call you at the scheduled time for Care Manager Telephone Visit.            Aug 30, 2017  9:20 AM   Established Patient with Susana Pete M.D.   [address] [place] [address] [phone]           You will be receiving a confirmation call a few days before your appointment from our automated call confirmation system.              Follow-up Information     1. Follow up with Aj Albright M.D.. Schedule an appointment as soon as possible for a visit in 2 weeks.    Specialty:  Urology    Contact information    [address] [place] [id number] [phone]           Discharge Medication Instructions:    Below are the medications your physician expects you to take upon discharge:    Review all your home medications and newly ordered medications with your doctor  and/or pharmacist. Follow medication instructions as directed by your doctor and/or pharmacist.    Please keep your medication list with you and share with your physician.               Medication List      Notice     You have not been prescribed any medications.            Instructions           Diet / Nutrition:    Follow any diet instructions given to you by your doctor or the dietician, including how much salt (sodium) you are allowed each day.    If you are overweight, talk to your doctor about a weight reduction plan.    Activity:    Remain physically active following your doctor's instructions about exercise and activity.    Rest often.     Any time you become even a little tired or short of breath, SIT DOWN and rest.    Worsening Symptoms:    Report any of the following signs and symptoms to the doctor's office immediately:    *Pain of jaw, arm, or neck  *Chest pain not relieved by medication                               *Dizziness or loss of consciousness  *Difficulty breathing even when at rest   *More tired than usual                                       *Bleeding drainage or swelling of surgical site  *Swelling of feet, ankles, legs or stomach                 *Fever (>100ºF)  *Pink or blood tinged sputum  *Weight gain (3lbs/day or 5lbs /week)           *Shock from internal defibrillator (if applicable)  *Palpitations or irregular heartbeats                *Cool and/or numb extremities    Stroke Awareness    Common Risk Factors for Stroke include:    Age  Atrial Fibrillation  Carotid Artery Stenosis  Diabetes Mellitus  Excessive alcohol consumption  High blood pressure  Overweight   Physical inactivity  Smoking    Warning signs and symptoms of a stroke include:    *Sudden numbness or weakness of the face, arm or leg (especially on one side of the body).  *Sudden confusion, trouble speaking or understanding.  *Sudden trouble seeing in one or both eyes.  *Sudden trouble walking, dizziness, loss of balance  or coordination.Sudden severe headache with no known cause.    It is very important to get treatment quickly when a stroke occurs. If you experience any of the above warning signs, call 911 immediately.                   Disclaimer         Quit Smoking / Tobacco Use:    I understand the use of any tobacco products increases my chance of suffering from future heart disease or stroke and could cause other illnesses which may shorten my life. Quitting the use of tobacco products is the single most important thing I can do to improve my health. For further information on smoking / tobacco cessation call a Toll Free Quit Line at 1-370.715.6359 (*National Cancer [employer]) or 1-354.864.8974 (American Lung Association) or you can access the web based program at www.lungWhitepages.org.    [place] Tobacco Users Help Line:  (417) 173-3998       Toll Free: 1-724.925.4526  Quit Tobacco Program [facility] (447)160-1487    Crisis Hotline:    [place] Crisis Hotline:  6-244-BUVYIAZ or 1-866.532.9070    [place] Crisis Hotline:    1-695.941.2223 or 698-475-5201    Discharge Survey:   Thank you for choosing [facility]. We hope we did everything we could to make your hospital stay a pleasant one. You may be receiving a phone survey and we would appreciate your time and participation in answering the questions. Your input is very valuable to us in our efforts to improve our service to our patients and their families.        My signature on this form indicates that:    1. I have reviewed and understand the above information.  2. My questions regarding this information have been answered to my satisfaction.  3. I have formulated a plan with my discharge nurse to obtain my prescribed medications for home.                  Disclaimer         __________________________________                     __________       ________                       Patient Signature                                                 Date                     Time

## 2017-08-21 NOTE — IP AVS SNAPSHOT
8/24/2017    Radha Combs  [address] [place] [address]    Dear Radha:    [facility] wants to ensure your discharge home is safe and you or your loved ones have had all of your questions answered regarding your care after you leave the hospital.    Below is a list of resources and contact information should you have any questions regarding your hospital stay, follow-up instructions, or active medical symptoms.    Questions or Concerns Regarding… Contact   Medical Questions Related to Your Discharge  (7 days a week, 8am-5pm) Contact a Nurse Care Coordinator   [phone]   Medical Questions Not Related to Your Discharge  (24 hours a day / 7 days a week)  Contact the Nurse Health Line   [phone]    Medications or Discharge Instructions Refer to your discharge packet   or contact your [facility] Primary Care Provider   [phone]   Follow-up Appointment(s) Schedule your appointment via [employer]   or contact Scheduling [phone]   Billing Review your statement via [employer]  or contact Billing [phone]   Medical Records Review your records via [employer]   or contact Medical Records [phone]     You may receive a telephone call within two days of discharge. This call is to make certain you understand your discharge instructions and have the opportunity to have any questions answered. You can also easily access your medical information, test results and upcoming appointments via the [employer] free online health management tool. You can learn more and sign up at [employer]SenseData. For assistance setting up your Sense[employer] account, please call [phone].    Once again, we want to ensure your discharge home is safe and that you have a clear understanding of any next steps in your care. If you have any questions or concerns, please do not hesitate to contact us, we are here for you. Thank you for choosing [facility] for your healthcare needs.    Sincerely,    Your [facility] Healthcare Team

## 2017-08-21 NOTE — ED NOTES
10/10 left flank and low back pain. Took magnesium citrate and z-pack on Friday to address possible UTI and constipation. On Sunday felt fine.

## 2017-08-21 NOTE — CONSULTS
UROLOGY Consult Note:    jA Shoreden  Date & Time note created:    8/21/2017   12:54 PM     Referring MD:      Patient ID:   Name:             Radha Combs   YOB: 1980  Age:                 37 y.o.  female   MRN:               [id number]                                                             Reason for Consult:      LEft 5mm prox stone, pain, r/o sepsis as has UTI sx over past several days and need o immediate stent    History of Present Illness:    H/u of UTI sx for which se took Z pack and lerft reanl colick with left flank pain x several hours and severe pain. Pain in left flank severe, non radiating and worse with movement and better with rest. CT with 5mm prox. stone and mild mod hydro. UA with WBCs and small BActeria. Cefrtriaxone given. PT wth no other  history.     Review of Systems:      Constitutional: Denies fevers, Denies weight changes  Eyes: Denies changes in vision, no eye pain  Ears/Nose/Throat/Mouth: Denies nasal congestion or sore throat   Cardiovascular: no chest pain, no palpitations   Respiratory: no shortness of breath , Denies cough  Gastrointestinal/Hepatic: +++Denies abdominal pain, + nausea,  No current vomiting, diarrhea, constipation or GI bleeding   Genitourinary: Denies hematuria, dysuria or frequency  Musculoskeletal/Rheum: Denies  joint pain and swelling, no edema  Skin: Denies rash  Neurological: Denies headache, confusion, memory loss or focal weakness/parasthesias  Psychiatric: denies mood disorder   Endocrine: Fallon thyroid problems  Heme/Oncology/Lymph Nodes: Denies enlarged lymph nodes, denies brusing or known bleeding disorder  All other systems were reviewed and are negative (AMA/CMS criteria)                Past Medical History:   Past Medical History   Diagnosis Date   • Anemia    • Back pain 1996   • Bladder infection 2003   • Blood transfusion 1985   • Kidney disease 1985   • Migraine    • Gynecological disorder      painful  periods; irregular periods   • ASTHMA      history of as a teenager     Active Hospital Problems    Diagnosis   • Hydronephrosis [N13.30]   • Nephrolithiasis [N20.0]       Past Surgical History:  Past Surgical History   Procedure Laterality Date   • Other       wisdom teeth   • Pelviscopy Bilateral 2/9/2016     Procedure: PELVISCOPY exploratory with bilateral saplingectomy ;  Surgeon: Yarely Bishop M.D.;  Location: SURGERY SAME DAY [facility];  Service:    • Hysteroscopy thermal ablation N/A 2/9/2016     Procedure: HYSTEROSCOPY hydro THERMAL ABLATION;  Surgeon: Yarely Bishop M.D.;  Location: [facility]ERY [facility];  Service:        Hospital Medications:  No current facility-administered medications for this encounter.    Current outpatient prescriptions:   •  conjugated estrogen (PREMARIN) 0.625 MG/GM Cream, 1 gram in the vagina nightly x 2 weeks (30 gram tube)., Disp: 1 Tube, Rfl: 3    Current Outpatient Medications:    (Not in a hospital admission)    Medication Allergy:  Allergies   Allergen Reactions   • Shellfish Allergy Vomiting   • Codeine Vomiting       Family History:  Family History   Problem Relation Age of Onset   • Allergies Mother    • Allergies Sister    • Cancer Maternal Grandmother    • Lung Disease Paternal Grandfather        Social History:  Social History     Social History   • Marital Status:      Spouse Name: N/A   • Number of Children: N/A   • Years of Education: N/A     Occupational History   • Not on file.     Social History Main Topics   • Smoking status: Never Smoker    • Smokeless tobacco: Never Used   • Alcohol Use: Yes      Comment: occasional wine   • Drug Use: No   • Sexual Activity:     Partners: Male     Other Topics Concern   • Not on file     Social History Narrative         Physical Exam:  Vitals/ General Appearance:   Weight/BMI: Body mass index is 19.01 kg/(m^2).  Blood pressure 126/87, pulse 85, temperature 36.7 °C (98 °F), resp. rate 16,  "height 1.676 m (5' 5.98\"), weight 53.4 kg (117 lb 11.6 oz), last menstrual period 07/31/2017, SpO2 93 %, not currently breastfeeding.  Filed Vitals:    08/21/17 1014 08/21/17 1112 08/21/17 1251 08/21/17 1252   BP: 126/87      Pulse: 102  81 85   Temp: 36.7 °C (98 °F)      Resp: 16      Height: 1.676 m (5' 5.98\")      Weight:  53.4 kg (117 lb 11.6 oz)     SpO2: 97%  96% 93%     Oxygen Therapy:  Pulse Oximetry: 93 %, O2 Delivery: None (Room Air)    Constitutional:   Well developed, Well nourished, No acute distress  HENMT:  Normocephalic, Atraumatic, Oropharynx moist mucous membranes, No oral exudates, Nose normal.  No thyromegaly.  Eyes:  EOMI, Conjunctiva normal, No discharge.  Neck:  Normal range of motion, No cervical tenderness,  no JVD.  Cardiovascular:  Normal heart rate, Normal rhythm, No murmurs, No rubs, No gallops.   Extremitites with intact distal pulses, no cyanosis, or edema.  Lungs:  Normal breath sounds, breath sounds clear to auscultation bilaterally,  no crackles, no wheezing.   Abdomen: Bowel sounds normal, Soft, No tenderness, No guarding, No rebound, No masses, No hepatosplenomegaly + LUQ and CV ATTP no peritoneal signs  Skin: Warm, Dry, No erythema, No rash, no induration.  Neurologic: Alert & oriented x 3, No focal deficits noted, cranial nerves II through X are grossly intact.  Psychiatric: Affect normal, Judgment normal, Mood normal.      MDM (Data Review):     Records reviewed and summarized in current documentation    Lab Data Review:  Recent Results (from the past 24 hour(s))   POC UA    Collection Time: 08/21/17  9:39 AM   Result Value Ref Range    POC Color Laura     POC Appearance Cloudy (A)     POC Glucose Negative Negative mg/dL    POC Ketones 15 (A) Negative mg/dL    POC Specific Gravity >=1.030 (A) 1.005-1.030    POC Blood Large (A) Negative    POC Urine PH 6.5 5.0-8.0    POC Protein 100 (A) Negative mg/dL    POC Nitrites Negative Negative    POC Leukocyte Esterase Trace (A) Negative "   URINALYSIS,CULTURE IF INDICATED    Collection Time: 08/21/17 10:30 AM   Result Value Ref Range    Micro Urine Req Microscopic     Color Yellow     Character Hazy (A)     Specific Gravity 1.025 <1.035    Ph 6.5 5.0-8.0    Glucose Negative Negative mg/dL    Ketones 15 (A) Negative mg/dL    Protein 100 (A) Negative mg/dL    Bilirubin Small (A) Negative    Nitrite Negative Negative    Leukocyte Esterase Trace (A) Negative    Occult Blood Large (A) Negative    Culture Indicated Yes UA Culture   URINE MICROSCOPIC (W/UA)    Collection Time: 08/21/17 10:30 AM   Result Value Ref Range    WBC 5-10 (A) /hpf    -150 (A) /hpf    Bacteria Few (A) None /hpf    Epithelial Cells Moderate (A) Few /hpf    Mucous Threads Many /hpf   COMP METABOLIC PANEL    Collection Time: 08/21/17 11:00 AM   Result Value Ref Range    Sodium 136 135 - 145 mmol/L    Potassium 4.6 3.6 - 5.5 mmol/L    Chloride 104 96 - 112 mmol/L    Co2 18 (L) 20 - 33 mmol/L    Anion Gap 14.0 (H) 0.0 - 11.9    Glucose 90 65 - 99 mg/dL    Bun 13 8 - 22 mg/dL    Creatinine 0.69 0.50 - 1.40 mg/dL    Calcium 9.1 8.4 - 10.2 mg/dL    AST(SGOT) 53 (H) 12 - 45 U/L    ALT(SGPT) 43 2 - 50 U/L    Alkaline Phosphatase 51 30 - 99 U/L    Total Bilirubin 0.6 0.1 - 1.5 mg/dL    Albumin 4.5 3.2 - 4.9 g/dL    Total Protein 7.8 6.0 - 8.2 g/dL    Globulin 3.3 1.9 - 3.5 g/dL    A-G Ratio 1.4 g/dL   LIPASE    Collection Time: 08/21/17 11:00 AM   Result Value Ref Range    Lipase 26 7 - 58 U/L   HCG QUAL SERUM    Collection Time: 08/21/17 11:00 AM   Result Value Ref Range    Beta-Hcg Qualitative Serum Negative Negative   ESTIMATED GFR    Collection Time: 08/21/17 11:00 AM   Result Value Ref Range    GFR If African American >60 >60 mL/min/1.73 m 2    GFR If Non African American >60 >60 mL/min/1.73 m 2   CBC WITH DIFFERENTIAL    Collection Time: 08/21/17 11:24 AM   Result Value Ref Range    WBC 6.1 4.8 - 10.8 K/uL    RBC 3.94 (L) 4.20 - 5.40 M/uL    Hemoglobin 13.7 12.0 - 16.0 g/dL     Hematocrit 38.4 37.0 - 47.0 %    MCV 97.5 81.4 - 97.8 fL    MCH 34.8 (H) 27.0 - 33.0 pg    MCHC 35.7 (H) 33.6 - 35.0 g/dL    RDW 43.5 35.9 - 50.0 fL    Platelet Count 128 (L) 164 - 446 K/uL    MPV 8.8 (L) 9.0 - 12.9 fL    Neutrophils-Polys 79.20 (H) 44.00 - 72.00 %    Lymphocytes 14.50 (L) 22.00 - 41.00 %    Monocytes 5.30 0.00 - 13.40 %    Eosinophils 0.20 0.00 - 6.90 %    Basophils 0.50 0.00 - 1.80 %    Immature Granulocytes 0.30 0.00 - 0.90 %    Nucleated RBC 0.00 /100 WBC    Neutrophils (Absolute) 4.82 2.00 - 7.15 K/uL    Lymphs (Absolute) 0.88 (L) 1.00 - 4.80 K/uL    Monos (Absolute) 0.32 0.00 - 0.85 K/uL    Eos (Absolute) 0.01 0.00 - 0.51 K/uL    Baso (Absolute) 0.03 0.00 - 0.12 K/uL    Immature Granulocytes (abs) 0.02 0.00 - 0.11 K/uL    NRBC (Absolute) 0.00 K/uL       Imaging/Procedures Review:    Reviewed  LAbs and CT    MDM (Assessment and Plan):     Active Hospital Problems    Diagnosis   • Hydronephrosis [N13.30]   • Nephrolithiasis [N20.0]       1. Admit for pain control, Abx., Fluids, Flomax. Assess this pm and stent if needed. If ok and UCx neg. could treat tomorrow as pts has kids and can't to Trial of passage per her. Call if has Fever of Signs of sepsis.

## 2017-08-21 NOTE — H&P
Hospital Medicine History and Physical    Date of Service  8/21/2017    Chief Complaint  Chief Complaint   Patient presents with   • Flank Pain     left       History of Presenting Illness  37 y.o. female who presented 8/21/2017 with acute left flank pain. Patient started to have pain on Friday and then the pain over the weekend intensified. She is now this morning developed nausea vomiting chills burning on urination and severe left flank pain that is 10 out of 10 intensity. The patient this point will be admitted to the medical floor for IV hydration and IV antibiotics and urological definitive stone extraction.    Primary Care Physician  Ziggy Urias D.O.    Consultants  Urology    Code Status  Full code    Review of Systems  ROS patient complains of left flank pain, complaints of nausea vomiting, complaints of chills and fevers, complaints of burning on urination, complaints of generalized weakness and loss of appetite. Denies any blood in the urine denies any chest pain and shortness of breath and headache all other review of systems were reviewed and are negative.    Past Medical History  Past Medical History   Diagnosis Date   • Anemia    • Back pain 1996   • Bladder infection 2003   • Blood transfusion 1985   • Kidney disease 1985   • Migraine    • Gynecological disorder      painful periods; irregular periods   • ASTHMA      history of as a teenager       Surgical History  Past Surgical History   Procedure Laterality Date   • Other       wisdom teeth   • Pelviscopy Bilateral 2/9/2016     Procedure: PELVISCOPY exploratory with bilateral saplingectomy ;  Surgeon: Yarely Bishop M.D.;  Location: SURGERY [facility];  Service:    • Hysteroscopy thermal ablation N/A 2/9/2016     Procedure: HYSTEROSCOPY hydro THERMAL ABLATION;  Surgeon: Yarely Bishop M.D.;  Location: SURGERY [facility] DAY [facility];  Service:        Medications  No current facility-administered medications on file  prior to encounter.     No current outpatient prescriptions on file prior to encounter.       Family History  Family History   Problem Relation Age of Onset   • Allergies Mother    • Allergies Sister    • Cancer Maternal Grandmother    • Lung Disease Paternal Grandfather        Social History  Social History   Substance Use Topics   • Smoking status: Never Smoker    • Smokeless tobacco: Never Used   • Alcohol Use: Yes      Comment: occasional wine       Allergies  Allergies   Allergen Reactions   • Shellfish Allergy Vomiting   • Codeine Vomiting        Physical Exam  Laboratory   Hemodynamics  Temp (24hrs), Av.6 °C (97.9 °F), Min:36.6 °C (97.8 °F), Max:36.7 °C (98 °F)   Temperature: 36.6 °C (97.8 °F)  Pulse  Av.8  Min: 69  Max: 102    Blood Pressure: 120/72 mmHg, NIBP: 111/77 mmHg      Respiratory      Respiration: 18, Pulse Oximetry: 94 %             Physical Exam  37-year-old female who appears in distress with left flank pain.  She is currently alert awake oriented ×3 pleasant cooperative female otherwise.  Head atraumatic no rashes on the scalp no pain on the scalp.  Eyes follow normal range of gaze, pupils are equal round reactive bilaterally, sclerae anicteric, conjunctiva is of normal hue.  Nose is midline without discharge no epistaxis no flaring of the naris.  Ears bilaterally are intact without any drainage no mastoid tenderness ear canals are patent.  Oral cavity moist with his memory stopping focus of infection in the oral cavity. Chest wall moves equally with inspiration and expiration of her toxic motion no reversible chest for tenderness.  Heart S1-S2 no murmurs gallops detected.  Lungs clear to auscultation no rales or rhonchi detected.  Abdomen is soft bowel sounds present all 4 quadrants no hepatomegaly, no splenomegaly, there is tenderness in the left lower quadrant there is tenderness in the left flank. It is 10 out of 10 in intensity.  Genital exam deferred rectal exam deferred.  Upper  and lower external nares positive pulses no edema good muscle strength good muscle tone positive deep tendon reflexes.  Neurologically cranial nerves II-12 grossly within normal limits without any focal deficit appreciated.  Skin normal turgor no rashes or abrasions identified.   Recent Labs      08/21/17   1124   WBC  6.1   RBC  3.94*   HEMOGLOBIN  13.7   HEMATOCRIT  38.4   MCV  97.5   MCH  34.8*   MCHC  35.7*   RDW  43.5   PLATELETCT  128*   MPV  8.8*     Recent Labs      08/21/17   1100   SODIUM  136   POTASSIUM  4.6   CHLORIDE  104   CO2  18*   GLUCOSE  90   BUN  13   CREATININE  0.69   CALCIUM  9.1     Recent Labs      08/21/17   1100   ALTSGPT  43   ASTSGOT  53*   ALKPHOSPHAT  51   TBILIRUBIN  0.6   LIPASE  26   GLUCOSE  90                 No results found for: TROPONINI    Imaging  CT-RENAL COLIC EVALUATION(A/P W/O)   Final Result      Left proximal ureteral 5 mm nephrolith causes moderate hydroureteronephrosis and of the adjacent fat stranding      4 mm nonobstructing left nephroliths      Severe hepatic steatosis           Assessment/Plan     I anticipate this patient will require at least two midnights for appropriate medical management, necessitating inpatient admission.    * Nephrolithiasis (present on admission)  Assessment & Plan  Urology has been counseled. They will be taking her surgery. She will be kept nothing by mouth for now.  Continue with pain management using IV fentanyl. And when necessary oxycodone.    Hydronephrosis (present on admission)  Assessment & Plan  Monitor hydronephrosis this should improve after the stone was extracted.    UTI (urinary tract infection) (present on admission)  Assessment & Plan  Continue with IV Rocephin monitor acidosis monitor blood cultures and urine cultures.    Thrombocytopenia (CMS-HCC) (present on admission)  Assessment & Plan  Monitor bleeding and platelet functions.    Metabolic acidosis (present on admission)  Assessment & Plan  This should resolve as  the urinary tract infections corrected and the nephrolithiasis resolves.      VTE prophylaxis: Lovenox .

## 2017-08-22 LAB
CHOLEST SERPL-MCNC: 169 MG/DL (ref 100–199)
ERYTHROCYTE [DISTWIDTH] IN BLOOD BY AUTOMATED COUNT: 43.8 FL (ref 35.9–50)
HCT VFR BLD AUTO: 39.4 % (ref 37–47)
HDLC SERPL-MCNC: 72 MG/DL
HGB BLD-MCNC: 14 G/DL (ref 12–16)
LDLC SERPL CALC-MCNC: 82 MG/DL
MCH RBC QN AUTO: 34.6 PG (ref 27–33)
MCHC RBC AUTO-ENTMCNC: 35.5 G/DL (ref 33.6–35)
MCV RBC AUTO: 97.3 FL (ref 81.4–97.8)
PLATELET # BLD AUTO: 135 K/UL (ref 164–446)
PMV BLD AUTO: 9.5 FL (ref 9–12.9)
RBC # BLD AUTO: 4.05 M/UL (ref 4.2–5.4)
TRIGL SERPL-MCNC: 77 MG/DL (ref 0–149)
WBC # BLD AUTO: 4.6 K/UL (ref 4.8–10.8)

## 2017-08-22 PROCEDURE — 700102 HCHG RX REV CODE 250 W/ 637 OVERRIDE(OP): Performed by: HOSPITALIST

## 2017-08-22 PROCEDURE — 700105 HCHG RX REV CODE 258: Performed by: NURSE PRACTITIONER

## 2017-08-22 PROCEDURE — 700111 HCHG RX REV CODE 636 W/ 250 OVERRIDE (IP): Performed by: HOSPITALIST

## 2017-08-22 PROCEDURE — 80061 LIPID PANEL: CPT

## 2017-08-22 PROCEDURE — A9270 NON-COVERED ITEM OR SERVICE: HCPCS | Performed by: HOSPITALIST

## 2017-08-22 PROCEDURE — 770006 HCHG ROOM/CARE - MED/SURG/GYN SEMI*

## 2017-08-22 PROCEDURE — 700102 HCHG RX REV CODE 250 W/ 637 OVERRIDE(OP): Performed by: UROLOGY

## 2017-08-22 PROCEDURE — 99232 SBSQ HOSP IP/OBS MODERATE 35: CPT | Performed by: HOSPITALIST

## 2017-08-22 PROCEDURE — 700105 HCHG RX REV CODE 258: Performed by: HOSPITALIST

## 2017-08-22 PROCEDURE — 85027 COMPLETE CBC AUTOMATED: CPT

## 2017-08-22 PROCEDURE — 36415 COLL VENOUS BLD VENIPUNCTURE: CPT

## 2017-08-22 PROCEDURE — A9270 NON-COVERED ITEM OR SERVICE: HCPCS | Performed by: UROLOGY

## 2017-08-22 RX ORDER — SODIUM CHLORIDE 9 MG/ML
500 INJECTION, SOLUTION INTRAVENOUS ONCE
Status: COMPLETED | OUTPATIENT
Start: 2017-08-22 | End: 2017-08-22

## 2017-08-22 RX ORDER — KETOROLAC TROMETHAMINE 30 MG/ML
30 INJECTION, SOLUTION INTRAMUSCULAR; INTRAVENOUS EVERY 6 HOURS PRN
Status: DISCONTINUED | OUTPATIENT
Start: 2017-08-22 | End: 2017-08-24 | Stop reason: HOSPADM

## 2017-08-22 RX ORDER — CALCIUM CARBONATE 500 MG/1
500 TABLET, CHEWABLE ORAL 4 TIMES DAILY PRN
Status: DISCONTINUED | OUTPATIENT
Start: 2017-08-22 | End: 2017-08-24 | Stop reason: HOSPADM

## 2017-08-22 RX ADMIN — OXYCODONE HYDROCHLORIDE 5 MG: 5 TABLET ORAL at 11:59

## 2017-08-22 RX ADMIN — PIPERACILLIN SODIUM,TAZOBACTAM SODIUM 4.5 G: 4; .5 INJECTION, POWDER, FOR SOLUTION INTRAVENOUS at 05:38

## 2017-08-22 RX ADMIN — STANDARDIZED SENNA CONCENTRATE AND DOCUSATE SODIUM 2 TABLET: 8.6; 5 TABLET, FILM COATED ORAL at 20:06

## 2017-08-22 RX ADMIN — SODIUM CHLORIDE: 9 INJECTION, SOLUTION INTRAVENOUS at 01:07

## 2017-08-22 RX ADMIN — KETOROLAC TROMETHAMINE 30 MG: 30 INJECTION, SOLUTION INTRAMUSCULAR at 20:09

## 2017-08-22 RX ADMIN — ONDANSETRON 4 MG: 2 INJECTION INTRAMUSCULAR; INTRAVENOUS at 11:06

## 2017-08-22 RX ADMIN — FENTANYL CITRATE 25 MCG: 50 INJECTION, SOLUTION INTRAMUSCULAR; INTRAVENOUS at 16:55

## 2017-08-22 RX ADMIN — OXYCODONE HYDROCHLORIDE 5 MG: 5 TABLET ORAL at 05:38

## 2017-08-22 RX ADMIN — TAMSULOSIN HYDROCHLORIDE 0.4 MG: 0.4 CAPSULE ORAL at 20:06

## 2017-08-22 RX ADMIN — FENTANYL CITRATE 50 MCG: 50 INJECTION, SOLUTION INTRAMUSCULAR; INTRAVENOUS at 13:14

## 2017-08-22 RX ADMIN — KETOROLAC TROMETHAMINE 30 MG: 30 INJECTION, SOLUTION INTRAMUSCULAR at 14:17

## 2017-08-22 RX ADMIN — PROCHLORPERAZINE EDISYLATE 10 MG: 5 INJECTION INTRAMUSCULAR; INTRAVENOUS at 14:27

## 2017-08-22 RX ADMIN — CALCIUM CARBONATE (ANTACID) CHEW TAB 500 MG 500 MG: 500 CHEW TAB at 17:03

## 2017-08-22 RX ADMIN — ACETAMINOPHEN 650 MG: 325 TABLET, FILM COATED ORAL at 16:55

## 2017-08-22 RX ADMIN — OXYCODONE HYDROCHLORIDE 5 MG: 5 TABLET ORAL at 21:05

## 2017-08-22 RX ADMIN — SODIUM CHLORIDE 500 ML: 9 INJECTION, SOLUTION INTRAVENOUS at 20:30

## 2017-08-22 RX ADMIN — OXYCODONE HYDROCHLORIDE 5 MG: 5 TABLET ORAL at 01:07

## 2017-08-22 RX ADMIN — ENOXAPARIN SODIUM 40 MG: 100 INJECTION SUBCUTANEOUS at 08:39

## 2017-08-22 RX ADMIN — PIPERACILLIN SODIUM,TAZOBACTAM SODIUM 4.5 G: 4; .5 INJECTION, POWDER, FOR SOLUTION INTRAVENOUS at 18:03

## 2017-08-22 RX ADMIN — ONDANSETRON 4 MG: 2 INJECTION INTRAMUSCULAR; INTRAVENOUS at 16:55

## 2017-08-22 RX ADMIN — FENTANYL CITRATE 50 MCG: 50 INJECTION, SOLUTION INTRAMUSCULAR; INTRAVENOUS at 20:33

## 2017-08-22 RX ADMIN — ONDANSETRON 4 MG: 2 INJECTION INTRAMUSCULAR; INTRAVENOUS at 21:41

## 2017-08-22 RX ADMIN — PIPERACILLIN SODIUM,TAZOBACTAM SODIUM 4.5 G: 4; .5 INJECTION, POWDER, FOR SOLUTION INTRAVENOUS at 12:00

## 2017-08-22 RX ADMIN — FENTANYL CITRATE 50 MCG: 50 INJECTION, SOLUTION INTRAMUSCULAR; INTRAVENOUS at 08:45

## 2017-08-22 RX ADMIN — STANDARDIZED SENNA CONCENTRATE AND DOCUSATE SODIUM 2 TABLET: 8.6; 5 TABLET, FILM COATED ORAL at 08:39

## 2017-08-22 RX ADMIN — PIPERACILLIN SODIUM,TAZOBACTAM SODIUM 4.5 G: 4; .5 INJECTION, POWDER, FOR SOLUTION INTRAVENOUS at 00:28

## 2017-08-22 ASSESSMENT — PAIN SCALES - GENERAL
PAINLEVEL_OUTOF10: 10
PAINLEVEL_OUTOF10: ASSUMED PAIN PRESENT
PAINLEVEL_OUTOF10: 6
PAINLEVEL_OUTOF10: 4
PAINLEVEL_OUTOF10: 10
PAINLEVEL_OUTOF10: ASSUMED PAIN PRESENT
PAINLEVEL_OUTOF10: 7
PAINLEVEL_OUTOF10: 4
PAINLEVEL_OUTOF10: 7

## 2017-08-22 ASSESSMENT — ENCOUNTER SYMPTOMS
DIZZINESS: 0
MYALGIAS: 0
FEVER: 0
HEADACHES: 0
NAUSEA: 0
VOMITING: 0
COUGH: 0
ABDOMINAL PAIN: 1
SHORTNESS OF BREATH: 0
CHILLS: 0
PALPITATIONS: 0

## 2017-08-22 NOTE — PROGRESS NOTES
Received Bedside report. Assumed care at 0715. This pt is AOx4, ambulatory with SBA, no N/V this morning, voiding, c/o 6/10 burning in the urethra. Patient and RN discussed plan of care including likely dc home today: questions answered. Labs noted, assessment complete, patient tolerating regular diet. Call light in place, fall precautions in place, patient educated on importance of calling for assistance. No additional needs at this time. VSS

## 2017-08-22 NOTE — OR NURSING
"1832: Patient to PACU from OR, no airway in place, arouses to touch but falls back asleep.  Respirations even and unlabored, breath sounds clear.   When asked if she has pain she shakes her head \"no.\"   1843: Patient sleeping, arouses to voice, desaturating to 87% on room air, patient placed on 2 lpm oxygen.     1845: Patient resting in bed, arouses to voice, no complaints of pain or nausea.  1900:  Patient awake, no reports of pain or nausea.  Reports she may need to urinate, but not yet.  She would like to go to the bathroom once she is back in her room.  I explained the need to strain her urine and she verbally acknowledged this.  Called spouse.  1915:  No changes, patient reports she is ready to see her  and kids.  No pain or nausea.  Patient meets criteria for transfer, called report to JUAREZ Oliveros.  1920: Patient transferred back to room.  "

## 2017-08-22 NOTE — PROGRESS NOTES
Patient arrived back to room from PACU via bed at 1920.  Patient is alert and oriented.  Up to the bathroom to void upon arrival to the floor.  Straining all urine.  Fentanyl given for c/o pain in urethra.   at bedside.  IV fluids infusing at 175ml/hr through PIV.

## 2017-08-22 NOTE — CARE PLAN
Problem: Safety  Goal: Will remain free from injury  Outcome: PROGRESSING AS EXPECTED  Mobility assessed. Pt out of bed with SBA. Discussed the importance of calling for assistance, verbalizes understanding. Upper bed rails up x2, bed in lowest position, hourly rounding in place, treaded socks on.     Problem: Pain Management  Goal: Pain level will decrease to patient’s comfort goal  Outcome: PROGRESSING AS EXPECTED  Numeric scale to assess pain. Prns per MAR. Encouraging rest/reposition for comfort.

## 2017-08-22 NOTE — OP REPORT
DATE OF SERVICE:  08/21/2017    INDICATION FOR PROCEDURE:  The patient has an intractable pain from a left   nonmigratory stone.  She has requested stenting due to her persistent pain.    We did not have urine culture.  Thus, we cannot treat the stone at this time.    PREOPERATIVE DIAGNOSIS:  Left proximal ureteral stone.    POSTOPERATIVE DIAGNOSIS:  Left proximal ureteral stone.    PROCEDURE PERFORMED:  Cystoscopy and left stent placement.    SURGEON:  Aj Albright MD.    ANESTHESIOLOGIST:  Praneeth Moses MD.    CIRCULATOR:  _____.    SPECIMEN:  None.    ESTIMATED BLOOD LOSS:  Zero.    FINDINGS:  Left proximal ureteral stone.    COMPLICATIONS:  None.    STENT:  6x24 double-J stent.    DISPOSITION:  Brian, home when stable.  Strain all urine.  Give patient a   strainer and strain urine now until she passes.  Followup in 2 weeks in the   office with KUB with Dr. Albright.    DESCRIPTION OF PROCEDURE:  The procedure was carried out in the following   fashion.  After the patient was properly identified, the labs reviewed,   consent was verified, the H and P updated.  Plan discussed with the patient   and the operative team.  The patient expressed verbal understanding of the   procedure and desired to proceed and had no further questions about the risks   and benefits of potential death, bleeding, lower urinary and upper urinary   tract damage, loss of kidney, potential failure of access and need for PCN   were explained to her.  The patient was then taken to the operative theater,   placed in supine position where general anesthesia was introduced.  She was   then moved to dorsal lithotomy position, care being taken to pad all pressure   points and avoid any perturbations of joints that may cause injury and this   was maintained throughout the procedure.  After surgical out and there were no   issues and all agreed to proceed, the patient' proper site and procedure were   identified.  We began the procedure with a  30-degree lens 21-Pakistani sheath   per urethra into the bladder.  The left UO was cannulized and an open-ended   ureteral catheter was passed up to the area of stone where the wire would not   pass.  We then got the wire to pass and then placed stent uneventfully with   good coil below and above, verified fluoroscopically and visually of good   placement.  The patient's bladder was then emptied and the patient was taken   to PACU in stable condition.       ____________________________________     MD BRIAN Streeter / BRE    DD:  08/21/2017 18:39:30  DT:  08/21/2017 19:19:43    D#:  [id number]  Job#:  [id number]

## 2017-08-22 NOTE — PROGRESS NOTES
Pt sitting up in bed. C/o 7/10 urethral pain. Medicated with PO prn. Will medicate with IV prn if pain persists. Respirations even and unlabored.

## 2017-08-22 NOTE — OR SURGEON
Operative Report    PreOp Diagnosis: left prox ureteral stone    PostOp Diagnosis: andrew    Procedure(s):  CYSTOSCOPY STENT PLACEMENT left - Wound Class: Clean    Surgeon(s):  Aj Albright M.D.    Anesthesiologist/Type of Anesthesia:  Anesthesiologist: Praneeth Moses M.D./General    Surgical Staff:  Circulator: Agustín Cooper R.N.  Scrub Person: Ana Paulson  Radiology Technologist: Abena Marks    Specimens:  none    Estimated Blood Loss: 0    Findings: left prox ureteral stone    Complications: none    Dispo: Pt to [facility], home when stable, Strain all urine, given pt strainer and stain all urien now until passes stone. F/u in 2 weeks with KUB in office with Sonali MCKENNA per UCX    8/21/2017 6:35 PM Aj Albright

## 2017-08-23 ENCOUNTER — APPOINTMENT (OUTPATIENT)
Dept: RADIOLOGY | Facility: MEDICAL CENTER | Age: 37
DRG: 694 | End: 2017-08-23
Attending: UROLOGY
Payer: COMMERCIAL

## 2017-08-23 LAB
ANION GAP SERPL CALC-SCNC: 6 MMOL/L (ref 0–11.9)
BACTERIA UR CULT: NORMAL
BUN SERPL-MCNC: 9 MG/DL (ref 8–22)
CALCIUM SERPL-MCNC: 8 MG/DL (ref 8.4–10.2)
CHLORIDE SERPL-SCNC: 108 MMOL/L (ref 96–112)
CO2 SERPL-SCNC: 22 MMOL/L (ref 20–33)
CREAT SERPL-MCNC: 0.76 MG/DL (ref 0.5–1.4)
ERYTHROCYTE [DISTWIDTH] IN BLOOD BY AUTOMATED COUNT: 43 FL (ref 35.9–50)
GFR SERPL CREATININE-BSD FRML MDRD: >60 ML/MIN/1.73 M 2
GLUCOSE SERPL-MCNC: 79 MG/DL (ref 65–99)
HCT VFR BLD AUTO: 36.3 % (ref 37–47)
HGB BLD-MCNC: 13 G/DL (ref 12–16)
MCH RBC QN AUTO: 34.3 PG (ref 27–33)
MCHC RBC AUTO-ENTMCNC: 35.8 G/DL (ref 33.6–35)
MCV RBC AUTO: 95.8 FL (ref 81.4–97.8)
PLATELET # BLD AUTO: 128 K/UL (ref 164–446)
PMV BLD AUTO: 9.7 FL (ref 9–12.9)
POTASSIUM SERPL-SCNC: 3.5 MMOL/L (ref 3.6–5.5)
RBC # BLD AUTO: 3.79 M/UL (ref 4.2–5.4)
SIGNIFICANT IND 70042: NORMAL
SITE SITE: NORMAL
SODIUM SERPL-SCNC: 136 MMOL/L (ref 135–145)
SOURCE SOURCE: NORMAL
WBC # BLD AUTO: 6.4 K/UL (ref 4.8–10.8)

## 2017-08-23 PROCEDURE — 700102 HCHG RX REV CODE 250 W/ 637 OVERRIDE(OP): Performed by: UROLOGY

## 2017-08-23 PROCEDURE — 99233 SBSQ HOSP IP/OBS HIGH 50: CPT | Performed by: HOSPITALIST

## 2017-08-23 PROCEDURE — 501329 HCHG SET, CYSTO IRRIG Y TUR: Performed by: UROLOGY

## 2017-08-23 PROCEDURE — 700102 HCHG RX REV CODE 250 W/ 637 OVERRIDE(OP): Performed by: HOSPITALIST

## 2017-08-23 PROCEDURE — 500880 HCHG PACK, CYSTO W/SEP LEGGINGS: Performed by: UROLOGY

## 2017-08-23 PROCEDURE — 700111 HCHG RX REV CODE 636 W/ 250 OVERRIDE (IP)

## 2017-08-23 PROCEDURE — 160035 HCHG PACU - 1ST 60 MINS PHASE I: Performed by: UROLOGY

## 2017-08-23 PROCEDURE — 80048 BASIC METABOLIC PNL TOTAL CA: CPT

## 2017-08-23 PROCEDURE — 36415 COLL VENOUS BLD VENIPUNCTURE: CPT

## 2017-08-23 PROCEDURE — 160029 HCHG SURGERY MINUTES - 1ST 30 MINS LEVEL 4: Performed by: UROLOGY

## 2017-08-23 PROCEDURE — 700111 HCHG RX REV CODE 636 W/ 250 OVERRIDE (IP): Performed by: HOSPITALIST

## 2017-08-23 PROCEDURE — 770006 HCHG ROOM/CARE - MED/SURG/GYN SEMI*

## 2017-08-23 PROCEDURE — A9270 NON-COVERED ITEM OR SERVICE: HCPCS

## 2017-08-23 PROCEDURE — A9270 NON-COVERED ITEM OR SERVICE: HCPCS | Performed by: HOSPITALIST

## 2017-08-23 PROCEDURE — 700101 HCHG RX REV CODE 250

## 2017-08-23 PROCEDURE — C1758 CATHETER, URETERAL: HCPCS | Performed by: UROLOGY

## 2017-08-23 PROCEDURE — 85027 COMPLETE CBC AUTOMATED: CPT

## 2017-08-23 PROCEDURE — C1769 GUIDE WIRE: HCPCS | Performed by: UROLOGY

## 2017-08-23 PROCEDURE — 160009 HCHG ANES TIME/MIN: Performed by: UROLOGY

## 2017-08-23 PROCEDURE — 160002 HCHG RECOVERY MINUTES (STAT): Performed by: UROLOGY

## 2017-08-23 PROCEDURE — 160036 HCHG PACU - EA ADDL 30 MINS PHASE I: Performed by: UROLOGY

## 2017-08-23 PROCEDURE — 160048 HCHG OR STATISTICAL LEVEL 1-5: Performed by: UROLOGY

## 2017-08-23 PROCEDURE — 700102 HCHG RX REV CODE 250 W/ 637 OVERRIDE(OP)

## 2017-08-23 PROCEDURE — 160041 HCHG SURGERY MINUTES - EA ADDL 1 MIN LEVEL 4: Performed by: UROLOGY

## 2017-08-23 PROCEDURE — 700105 HCHG RX REV CODE 258: Performed by: HOSPITALIST

## 2017-08-23 PROCEDURE — A4357 BEDSIDE DRAINAGE BAG: HCPCS | Performed by: UROLOGY

## 2017-08-23 PROCEDURE — A9270 NON-COVERED ITEM OR SERVICE: HCPCS | Performed by: UROLOGY

## 2017-08-23 RX ORDER — MEPERIDINE HYDROCHLORIDE 25 MG/ML
INJECTION INTRAMUSCULAR; INTRAVENOUS; SUBCUTANEOUS
Status: COMPLETED
Start: 2017-08-23 | End: 2017-08-23

## 2017-08-23 RX ORDER — OXYCODONE HYDROCHLORIDE AND ACETAMINOPHEN 5; 325 MG/1; MG/1
TABLET ORAL
Status: COMPLETED
Start: 2017-08-23 | End: 2017-08-23

## 2017-08-23 RX ORDER — HYDRALAZINE HYDROCHLORIDE 20 MG/ML
INJECTION INTRAMUSCULAR; INTRAVENOUS
Status: COMPLETED
Start: 2017-08-23 | End: 2017-08-23

## 2017-08-23 RX ORDER — HALOPERIDOL 5 MG/ML
INJECTION INTRAMUSCULAR
Status: COMPLETED
Start: 2017-08-23 | End: 2017-08-23

## 2017-08-23 RX ORDER — DIPHENHYDRAMINE HCL 25 MG
TABLET ORAL
Status: COMPLETED
Start: 2017-08-23 | End: 2017-08-23

## 2017-08-23 RX ORDER — SODIUM CHLORIDE, SODIUM LACTATE, POTASSIUM CHLORIDE, CALCIUM CHLORIDE 600; 310; 30; 20 MG/100ML; MG/100ML; MG/100ML; MG/100ML
INJECTION, SOLUTION INTRAVENOUS
Status: DISCONTINUED | OUTPATIENT
Start: 2017-08-23 | End: 2017-08-24

## 2017-08-23 RX ORDER — POTASSIUM CHLORIDE 20 MEQ/1
40 TABLET, EXTENDED RELEASE ORAL ONCE
Status: COMPLETED | OUTPATIENT
Start: 2017-08-23 | End: 2017-08-23

## 2017-08-23 RX ORDER — SIMETHICONE 80 MG
80 TABLET,CHEWABLE ORAL 3 TIMES DAILY PRN
Status: DISCONTINUED | OUTPATIENT
Start: 2017-08-23 | End: 2017-08-24 | Stop reason: HOSPADM

## 2017-08-23 RX ORDER — DIPHENHYDRAMINE HYDROCHLORIDE 50 MG/ML
INJECTION INTRAMUSCULAR; INTRAVENOUS
Status: COMPLETED
Start: 2017-08-23 | End: 2017-08-23

## 2017-08-23 RX ADMIN — KETOROLAC TROMETHAMINE 30 MG: 30 INJECTION, SOLUTION INTRAMUSCULAR at 02:16

## 2017-08-23 RX ADMIN — FENTANYL CITRATE 50 MCG: 50 INJECTION, SOLUTION INTRAMUSCULAR; INTRAVENOUS at 16:50

## 2017-08-23 RX ADMIN — HALOPERIDOL LACTATE 1 MG: 5 INJECTION, SOLUTION INTRAMUSCULAR at 17:44

## 2017-08-23 RX ADMIN — KETOROLAC TROMETHAMINE 30 MG: 30 INJECTION, SOLUTION INTRAMUSCULAR at 09:08

## 2017-08-23 RX ADMIN — OXYCODONE HYDROCHLORIDE AND ACETAMINOPHEN 1 TABLET: 5; 325 TABLET ORAL at 17:42

## 2017-08-23 RX ADMIN — MEPERIDINE HYDROCHLORIDE 25 MG: 25 INJECTION INTRAMUSCULAR; INTRAVENOUS; SUBCUTANEOUS at 17:35

## 2017-08-23 RX ADMIN — PIPERACILLIN SODIUM,TAZOBACTAM SODIUM 4.5 G: 4; .5 INJECTION, POWDER, FOR SOLUTION INTRAVENOUS at 18:18

## 2017-08-23 RX ADMIN — POTASSIUM CHLORIDE 40 MEQ: 20 TABLET, EXTENDED RELEASE ORAL at 09:15

## 2017-08-23 RX ADMIN — PIPERACILLIN SODIUM,TAZOBACTAM SODIUM 4.5 G: 4; .5 INJECTION, POWDER, FOR SOLUTION INTRAVENOUS at 23:08

## 2017-08-23 RX ADMIN — FENTANYL CITRATE 50 MCG: 50 INJECTION, SOLUTION INTRAMUSCULAR; INTRAVENOUS at 00:23

## 2017-08-23 RX ADMIN — ONDANSETRON 4 MG: 2 INJECTION INTRAMUSCULAR; INTRAVENOUS at 02:16

## 2017-08-23 RX ADMIN — PIPERACILLIN SODIUM,TAZOBACTAM SODIUM 4.5 G: 4; .5 INJECTION, POWDER, FOR SOLUTION INTRAVENOUS at 05:25

## 2017-08-23 RX ADMIN — DIPHENHYDRAMINE HYDROCHLORIDE 12.5 MG: 50 INJECTION, SOLUTION INTRAMUSCULAR; INTRAVENOUS at 16:49

## 2017-08-23 RX ADMIN — PIPERACILLIN SODIUM,TAZOBACTAM SODIUM 4.5 G: 4; .5 INJECTION, POWDER, FOR SOLUTION INTRAVENOUS at 00:01

## 2017-08-23 RX ADMIN — STANDARDIZED SENNA CONCENTRATE AND DOCUSATE SODIUM 2 TABLET: 8.6; 5 TABLET, FILM COATED ORAL at 09:15

## 2017-08-23 RX ADMIN — OXYCODONE HYDROCHLORIDE 5 MG: 5 TABLET ORAL at 21:58

## 2017-08-23 RX ADMIN — PIPERACILLIN SODIUM,TAZOBACTAM SODIUM 4.5 G: 4; .5 INJECTION, POWDER, FOR SOLUTION INTRAVENOUS at 11:48

## 2017-08-23 RX ADMIN — TAMSULOSIN HYDROCHLORIDE 0.4 MG: 0.4 CAPSULE ORAL at 21:57

## 2017-08-23 RX ADMIN — OXYCODONE HYDROCHLORIDE AND ACETAMINOPHEN 1 TABLET: 5; 325 TABLET ORAL at 16:50

## 2017-08-23 RX ADMIN — FENTANYL CITRATE 50 MCG: 50 INJECTION, SOLUTION INTRAMUSCULAR; INTRAVENOUS at 17:45

## 2017-08-23 RX ADMIN — FENTANYL CITRATE 50 MCG: 50 INJECTION, SOLUTION INTRAMUSCULAR; INTRAVENOUS at 17:15

## 2017-08-23 RX ADMIN — ENOXAPARIN SODIUM 40 MG: 100 INJECTION SUBCUTANEOUS at 09:15

## 2017-08-23 RX ADMIN — HYDRALAZINE HYDROCHLORIDE 5 MG: 20 INJECTION, SOLUTION INTRAMUSCULAR; INTRAVENOUS at 16:59

## 2017-08-23 RX ADMIN — SODIUM CHLORIDE, SODIUM LACTATE, POTASSIUM CHLORIDE, CALCIUM CHLORIDE: 600; 310; 30; 20 INJECTION, SOLUTION INTRAVENOUS at 15:40

## 2017-08-23 RX ADMIN — SIMETHICONE CHEW TAB 80 MG 80 MG: 80 TABLET ORAL at 11:48

## 2017-08-23 ASSESSMENT — PAIN SCALES - GENERAL
PAINLEVEL_OUTOF10: 3
PAINLEVEL_OUTOF10: 5
PAINLEVEL_OUTOF10: 4
PAINLEVEL_OUTOF10: 0
PAINLEVEL_OUTOF10: 3
PAINLEVEL_OUTOF10: 6
PAINLEVEL_OUTOF10: ASSUMED PAIN PRESENT
PAINLEVEL_OUTOF10: 5
PAINLEVEL_OUTOF10: 0
PAINLEVEL_OUTOF10: 4
PAINLEVEL_OUTOF10: 6
PAINLEVEL_OUTOF10: 4

## 2017-08-23 ASSESSMENT — ENCOUNTER SYMPTOMS
CHILLS: 0
ABDOMINAL PAIN: 1
MYALGIAS: 0
DIZZINESS: 0
SHORTNESS OF BREATH: 0
VOMITING: 0
NERVOUS/ANXIOUS: 1
COUGH: 0
NAUSEA: 0
PALPITATIONS: 0
HEADACHES: 0
FEVER: 0

## 2017-08-23 NOTE — PROGRESS NOTES
Edwina Lee, NP returned call made aware of pts bloody urine output.NP said she will enter order for NS bolus 500 ml.x 1

## 2017-08-23 NOTE — PROGRESS NOTES
Pt reminded to reduce use of ivp pain meds  Because of low heart rate 49 per minute.. Pts o2 sat of 92% on 2m lit/m. Fluids encouraged. Will continue to monitor. Pt stated she has less pain now.

## 2017-08-23 NOTE — PROGRESS NOTES
[facility]ist Progress Note    Date of Service: 2017    Chief Complaint  37 y.o. female admitted 2017 with abdominal pain from kidney stone    Interval Problem Update  Still with significant pain, hematuria; now on surgery schedule with Dr. Markel wray at 6pm.    Consultants/Specialty  Urology Dr. Albright/Markel    Disposition  Home tomorrow? If surgery goes well    39 minutes spent, >50% counseling and coordinating care of patient  D/W urology      Review of Systems   Constitutional: Negative for fever and chills.   Respiratory: Negative for cough and shortness of breath.    Cardiovascular: Negative for chest pain and palpitations.   Gastrointestinal: Positive for abdominal pain (stil significant). Negative for nausea and vomiting.   Genitourinary: Positive for dysuria (burning). Negative for hematuria.   Musculoskeletal: Negative for myalgias.   Neurological: Negative for dizziness and headaches.   Psychiatric/Behavioral: The patient is nervous/anxious (tearful as she wanted procedure done).       Physical Exam  Laboratory/Imaging   Hemodynamics  Temp (24hrs), Av.8 °C (98.2 °F), Min:36.5 °C (97.7 °F), Max:36.9 °C (98.5 °F)   Temperature: 36.9 °C (98.4 °F)  Pulse  Av.6  Min: 48  Max: 102   Blood Pressure: 132/99 mmHg      Respiratory      Respiration: 18, Pulse Oximetry: 95 %             Fluids    Intake/Output Summary (Last 24 hours) at 17 1335  Last data filed at 17 1100   Gross per 24 hour   Intake   2890 ml   Output   2525 ml   Net    365 ml       Nutrition  Orders Placed This Encounter   Procedures   • DIET NPO     Standing Status: Standing      Number of Occurrences: 1      Standing Expiration Date:      Order Specific Question:  Restrict to:     Answer:  Ice Chips [2]     Physical Exam   Constitutional: She is oriented to person, place, and time.   Cardiovascular: Normal rate, regular rhythm and normal heart sounds.    No murmur heard.  Pulmonary/Chest: Effort  normal and breath sounds normal. No respiratory distress. She has no wheezes. She has no rales.   Abdominal: Soft. Bowel sounds are normal. She exhibits no distension. There is no tenderness.   Musculoskeletal: Normal range of motion. She exhibits no edema.   Neurological: She is alert and oriented to person, place, and time.   Skin: Skin is warm and dry. No erythema.   Psychiatric:   Very anxious, tearful   Nursing note and vitals reviewed.      Recent Labs      08/21/17   1124  08/22/17   0546  08/23/17   0517   WBC  6.1  4.6*  6.4   RBC  3.94*  4.05*  3.79*   HEMOGLOBIN  13.7  14.0  13.0   HEMATOCRIT  38.4  39.4  36.3*   MCV  97.5  97.3  95.8   MCH  34.8*  34.6*  34.3*   MCHC  35.7*  35.5*  35.8*   RDW  43.5  43.8  43.0   PLATELETCT  128*  135*  128*   MPV  8.8*  9.5  9.7     Recent Labs      08/21/17   1100  08/23/17   0517   SODIUM  136  136   POTASSIUM  4.6  3.5*   CHLORIDE  104  108   CO2  18*  22   GLUCOSE  90  79   BUN  13  9   CREATININE  0.69  0.76   CALCIUM  9.1  8.0*             Recent Labs      08/22/17   0546   TRIGLYCERIDE  77   HDL  72   LDL  82          Assessment/Plan     * Nephrolithiasis (present on admission)  Assessment & Plan  -urology signed off yesterday; stent the previous night, plan for f/u in 2 weeks  -as patient is still in significant discomfort and ucx negative, urology back on case and plans treatment this pm    Hydronephrosis (present on admission)  Assessment & Plan  -s/p stent placement; cr good    UTI (urinary tract infection) (present on admission)  Assessment & Plan  -cx negative; on zosyn, will likely dc post surgery    Thrombocytopenia (CMS-HCC) (present on admission)  Assessment & Plan  -having hematuria; plts stable 128 from 135    Metabolic acidosis (present on admission)  Assessment & Plan  -resolved      Labs reviewed and Medications reviewed  Gusman catheter: No Gusman      DVT Prophylaxis: Enoxaparin (Lovenox)    Ulcer prophylaxis: Not indicated  Antibiotics: Treating  active infection/contamination beyond 24 hours perioperative coverage

## 2017-08-23 NOTE — OR SURGEON
Operative Report    PreOp Diagnosis: L proximal ureteral calculus; L ureteral stent pain    PostOp Diagnosis: L distal ureteral calculus and L stent pain    Procedure(s):  URETEROSCOPY - WITH UTERAL STENT REMOVAL  - Wound Class: Clean  LASERTRIPSY - Wound Class: Clean    Surgeon(s):  Bravo Jean Baptiste M.D.    Anesthesiologist/Type of Anesthesia:  Anesthesiologist: Jonny Mcintyre D.O.  Anesthesia Technician: Lencho Hurtado/General    Surgical Staff:  Circulator: Agustín Cooper R.N.  Scrub Person: Cruz Bloom  Radiology Technologist: Jose Miguel Santoro    Specimens:  * No specimens in log *    Estimated Blood Loss: none    Findings: 4 mm distal L ureteral calculus; No renal calculi    Complications: none.  To PACU stable.  Stent removed.        8/23/2017 4:48 PM Bravo Jean Baptiste

## 2017-08-23 NOTE — PROGRESS NOTES
Pt still having light bloody color urine output . Pt still continuous to ask for pain meds, pain level is less compared earlier (according to patient).

## 2017-08-23 NOTE — OR NURSING
1530: Brought patient back to pre-op and assumed care.  1545: Patient allergies and NPO status verified, home medication reconciliation completed and belongings secured. Patient verbalizes understanding of pain scale, expected course of stay and plan of care. Surgical site verified with patient. IV access established. Sequentials placed on legs.

## 2017-08-23 NOTE — PROGRESS NOTES
[facility]    Date of Service: 2017    Chief Complaint  37 y.o. female admitted 2017 with abdominal pain from kidney stone    Interval Problem Update  S/P stenting yesterday evening; Dr. Albright signed off today; plans for f/u in office in 2 weeks.  Still with a lot of pain; nausea.    Consultants/Specialty  Urology Dr. Albright    Disposition  Home tomorrow        Review of Systems   Constitutional: Negative for fever and chills.   Respiratory: Negative for cough and shortness of breath.    Cardiovascular: Negative for chest pain and palpitations.   Gastrointestinal: Positive for abdominal pain. Negative for nausea and vomiting.   Genitourinary: Positive for dysuria. Negative for hematuria.   Musculoskeletal: Negative for myalgias.   Neurological: Negative for dizziness and headaches.      Physical Exam  Laboratory/Imaging   Hemodynamics  Temp (24hrs), Av.7 °C (98.1 °F), Min:36.5 °C (97.7 °F), Max:36.9 °C (98.5 °F)   Temperature: 36.9 °C (98.5 °F)  Pulse  Av.1  Min: 48  Max: 102   Blood Pressure: 152/76 mmHg      Respiratory      Respiration: 16, Pulse Oximetry: 93 %             Fluids    Intake/Output Summary (Last 24 hours) at 17 2018  Last data filed at 17 1700   Gross per 24 hour   Intake   5060 ml   Output   2425 ml   Net   2635 ml       Nutrition  Orders Placed This Encounter   Procedures   • DIET ORDER     Standing Status: Standing      Number of Occurrences: 1      Standing Expiration Date:      Order Specific Question:  Diet:     Answer:  Regular [1]     Physical Exam   Constitutional: She is oriented to person, place, and time. She appears well-developed and well-nourished.   Cardiovascular: Normal rate, regular rhythm and normal heart sounds.    No murmur heard.  Pulmonary/Chest: Effort normal and breath sounds normal. No respiratory distress. She has no wheezes. She has no rales.   Abdominal: Soft. Bowel sounds are normal. She exhibits no distension.  There is no tenderness.   Musculoskeletal: Normal range of motion. She exhibits no edema.   Neurological: She is alert and oriented to person, place, and time.   Skin: Skin is warm and dry. No erythema.   Nursing note and vitals reviewed.      Recent Labs      08/21/17   1124  08/22/17   0546   WBC  6.1  4.6*   RBC  3.94*  4.05*   HEMOGLOBIN  13.7  14.0   HEMATOCRIT  38.4  39.4   MCV  97.5  97.3   MCH  34.8*  34.6*   MCHC  35.7*  35.5*   RDW  43.5  43.8   PLATELETCT  128*  135*   MPV  8.8*  9.5     Recent Labs      08/21/17   1100   SODIUM  136   POTASSIUM  4.6   CHLORIDE  104   CO2  18*   GLUCOSE  90   BUN  13   CREATININE  0.69   CALCIUM  9.1             Recent Labs      08/22/17   0546   TRIGLYCERIDE  77   HDL  72   LDL  82          Assessment/Plan     * Nephrolithiasis (present on admission)  Assessment & Plan  -urology signed off today; stent last night, plan for f/u in 2 weeks    Hydronephrosis (present on admission)  Assessment & Plan  -s/p stent placement; cr fine    UTI (urinary tract infection) (present on admission)  Assessment & Plan  -cx negative; on c3    Thrombocytopenia (CMS-HCC) (present on admission)  Assessment & Plan  -no signs of bleeding today; improved plts 135    Metabolic acidosis (present on admission)  Assessment & Plan  -recheck in am      Labs reviewed and Medications reviewed  Gusman catheter: No Gusman      DVT Prophylaxis: Enoxaparin (Lovenox)    Ulcer prophylaxis: Not indicated  Antibiotics: Treating active infection/contamination beyond 24 hours perioperative coverage

## 2017-08-23 NOTE — PROGRESS NOTES
36 y/o female with L upper ureteral calculus.  Had L stent placed 2 days ago.  Unable to leave hospital due to stent pain.  Will do L ureteroscopy, lasertripsy of stone and try and leave stent  Out but may need for several days more.  Risks and benefits discussed.  Will f/u as outpatient.      Bravo Jean Baptiste MD

## 2017-08-23 NOTE — PROGRESS NOTES
A&Ox4, complaints of pain with urination, bloody urine, medicated for pain, bloated, last BM am 8/23.

## 2017-08-24 ENCOUNTER — PATIENT OUTREACH (OUTPATIENT)
Dept: HEALTH INFORMATION MANAGEMENT | Facility: OTHER | Age: 37
End: 2017-08-24

## 2017-08-24 VITALS
BODY MASS INDEX: 18.92 KG/M2 | WEIGHT: 117.73 LBS | DIASTOLIC BLOOD PRESSURE: 85 MMHG | SYSTOLIC BLOOD PRESSURE: 160 MMHG | HEIGHT: 66 IN | OXYGEN SATURATION: 94 % | RESPIRATION RATE: 16 BRPM | HEART RATE: 54 BPM | TEMPERATURE: 98.2 F

## 2017-08-24 PROBLEM — N13.30 HYDRONEPHROSIS: Status: RESOLVED | Noted: 2017-08-21 | Resolved: 2017-08-24

## 2017-08-24 PROBLEM — N20.0 NEPHROLITHIASIS: Status: RESOLVED | Noted: 2017-08-21 | Resolved: 2017-08-24

## 2017-08-24 PROBLEM — E87.20 METABOLIC ACIDOSIS: Status: RESOLVED | Noted: 2017-08-21 | Resolved: 2017-08-24

## 2017-08-24 PROBLEM — N39.0 UTI (URINARY TRACT INFECTION): Status: RESOLVED | Noted: 2017-08-21 | Resolved: 2017-08-24

## 2017-08-24 LAB
ANION GAP SERPL CALC-SCNC: 9 MMOL/L (ref 0–11.9)
BUN SERPL-MCNC: 6 MG/DL (ref 8–22)
CALCIUM SERPL-MCNC: 8.4 MG/DL (ref 8.4–10.2)
CHLORIDE SERPL-SCNC: 105 MMOL/L (ref 96–112)
CO2 SERPL-SCNC: 25 MMOL/L (ref 20–33)
CREAT SERPL-MCNC: 0.66 MG/DL (ref 0.5–1.4)
ERYTHROCYTE [DISTWIDTH] IN BLOOD BY AUTOMATED COUNT: 44.1 FL (ref 35.9–50)
GFR SERPL CREATININE-BSD FRML MDRD: >60 ML/MIN/1.73 M 2
GLUCOSE SERPL-MCNC: 108 MG/DL (ref 65–99)
HCT VFR BLD AUTO: 38.6 % (ref 37–47)
HGB BLD-MCNC: 13.8 G/DL (ref 12–16)
MCH RBC QN AUTO: 35.2 PG (ref 27–33)
MCHC RBC AUTO-ENTMCNC: 35.8 G/DL (ref 33.6–35)
MCV RBC AUTO: 98.5 FL (ref 81.4–97.8)
PLATELET # BLD AUTO: 128 K/UL (ref 164–446)
PMV BLD AUTO: 9.9 FL (ref 9–12.9)
POTASSIUM SERPL-SCNC: 3.9 MMOL/L (ref 3.6–5.5)
RBC # BLD AUTO: 3.92 M/UL (ref 4.2–5.4)
SODIUM SERPL-SCNC: 139 MMOL/L (ref 135–145)
WBC # BLD AUTO: 5.9 K/UL (ref 4.8–10.8)

## 2017-08-24 PROCEDURE — 80048 BASIC METABOLIC PNL TOTAL CA: CPT

## 2017-08-24 PROCEDURE — 36415 COLL VENOUS BLD VENIPUNCTURE: CPT

## 2017-08-24 PROCEDURE — 99239 HOSP IP/OBS DSCHRG MGMT >30: CPT | Performed by: HOSPITALIST

## 2017-08-24 PROCEDURE — 85027 COMPLETE CBC AUTOMATED: CPT

## 2017-08-24 RX ADMIN — PIPERACILLIN SODIUM,TAZOBACTAM SODIUM 4.5 G: 4; .5 INJECTION, POWDER, FOR SOLUTION INTRAVENOUS at 06:32

## 2017-08-24 ASSESSMENT — PAIN SCALES - GENERAL
PAINLEVEL_OUTOF10: 0

## 2017-08-24 NOTE — PROGRESS NOTES
08/24/2017 1410 - Discharge Outreach - received call from patient. States discharged today after lithotripsy. States abd now swollen. Denies pain and states she is emptying her bladder without difficulty. Advised to call urologist to clarify. No other questions or concerns.

## 2017-08-24 NOTE — OR NURSING
Respirations easy in PACU. Dr. Jean Baptiste spoke with patient in PACU. Second Percocet given when pain increased, Demerol for shivering with good effect.  Voided times 2 in PACU, urine dilute bloody, no clots.

## 2017-08-24 NOTE — PROGRESS NOTES
"Urology Progress Note    Post op Day # 1    Overnight Events: None    S: No fevers, chills, nausea or vomiting.  Feeling much better. Anxious to go home.    O:   Blood pressure 160/85, pulse 54, temperature 36.8 °C (98.2 °F), resp. rate 16, height 1.676 m (5' 5.98\"), weight 53.4 kg (117 lb 11.6 oz), last menstrual period 07/31/2017, SpO2 94 %, not currently breastfeeding.  Recent Labs      08/21/17   1100  08/23/17   0517  08/24/17   0410   SODIUM  136  136  139   POTASSIUM  4.6  3.5*  3.9   CHLORIDE  104  108  105   CO2  18*  22  25   GLUCOSE  90  79  108*   BUN  13  9  6*   CREATININE  0.69  0.76  0.66   CALCIUM  9.1  8.0*  8.4     Recent Labs      08/22/17   0546  08/23/17   0517  08/24/17   0410   WBC  4.6*  6.4  5.9   RBC  4.05*  3.79*  3.92*   HEMOGLOBIN  14.0  13.0  13.8   HEMATOCRIT  39.4  36.3*  38.6   MCV  97.3  95.8  98.5*   MCH  34.6*  34.3*  35.2*   MCHC  35.5*  35.8*  35.8*   RDW  43.8  43.0  44.1   PLATELETCT  135*  128*  128*   MPV  9.5  9.7  9.9         Intake/Output Summary (Last 24 hours) at 08/24/17 0815  Last data filed at 08/24/17 0800   Gross per 24 hour   Intake   3240 ml   Output   3525 ml   Net   -285 ml       Exam:  Abdomen soft, benign.   Urine: clear      A/P:    Active Hospital Problems    Diagnosis   • Hydronephrosis [N13.30]     Priority: High   • Nephrolithiasis [N20.0]     Priority: High   • UTI (urinary tract infection) [N39.0]     Priority: Medium   • Thrombocytopenia (CMS-HCC) [D69.6]     Priority: Medium   • Metabolic acidosis [E87.2]     Priority: Medium       Stable.   Ambulate, IS.  Urology signing off  F/u if further problems  "

## 2017-08-24 NOTE — OP REPORT
DATE OF SERVICE:  08/23/2017    PREOPERATIVE DIAGNOSES:  Left proximal ureteral calculus, left renal colic and   left ureteral stent discomfort.    POSTOPERATIVE DIAGNOSES:  Left distal ureteral calculus with left stent   discomfort.    PROCEDURE:  Cystoscopy, left ureteroscopy, lasertripsy of distal ureteral   calculus with ureteroscopy of left renal pelvis and removal of left ureteral   stent.    ANESTHESIA:  General.    ANESTHESIOLOGIST:  Jonny Mcintyre DO.    SURGEON:  Bravo Jean Baptiste MD    BRIEF HISTORY:  This 37-year-old female presented to the emergency room 2 days   ago with left renal colic.  She was found to have a 5 mm proximal left   ureteral calculus with hydronephrosis.  She was admitted at that time by Dr. Aj Albright for cystoscopy and insertion of left ureteral stent, which was   done.  She has been unable to go home, because of severe left stent   discomfort.  She now presents for cystoscopy, ureteroscopy, lasertripsy of the   stone and hopefully we can leave the stent out.  Risks and benefits were   discussed with the patient in the preoperative area.    REPORT OF OPERATION:  Under general anesthesia with the patient in lithotomy   position, the genitalia were prepped and draped in usual manner.  The   25-[place] cystoscope was introduced into the bladder.  The urethra was   unremarkable.  The bladder did show the stent in position and quite a bit of   blood coming from the left ureteral orifice.  The stent was grasped with an   alligator grasper and pulled outside the ureteral orifice.  A zip wire was   passed up the stent to the left kidney under fluoroscopic guidance.  The stent   was removed.  At this point, an attempt to pass the flexible ureteroscope   over the wire was met with resistance in the distal urethra and in fact the   stone had moved down into the lower ureter.  At this point, I did exchange out   the flexible ureteroscope for the rigid scope.  This was then passed up  into   the ureter.  At this point, using the 200 micron laser fiber with power   settings of initially 20 Hz and 0.2 joules, this did require the power to be   turned up to 0.5 joules and at this point, the stone did break.  There was   complete and total fragmentation of the stone.  All fragments did flow out of   the ureter without difficulty.  The zip wire was replaced back up to the   kidney.  At this point, the flexible ureteroscope was passed back and up the   ureter over the wire to the kidney.  There was quite a few number of small   clots up in the ureter and kidney.  Examination at this point did show no   abnormalities of any other stones despite the CT report suggesting there was a   4 mm stone in the kidney.  At this point, complete examination failed to   reveal any stone and I did check all calices as best I could, visibility is   moderately limited because of the clots in the renal pelvis.  At this point, I   backloaded the ureteroscope down the ureter and no other abnormalities were   seen.  At this point, it is felt that she could go without the stent.  At this   point, the cystoscope was replaced into the bladder.  The bladder was drained   and the cystoscope removed.  No fragments were collected as they were too   small.  At this point, the patient was cleaned up, woken up and transferred to   the recovery room in stable condition.       ____________________________________     MD JUANITO JEFFERSON / BRE    DD:  08/23/2017 16:56:32  DT:  08/23/2017 18:41:44    D#:  [id number]  Job#:  [id number]

## 2017-08-24 NOTE — DISCHARGE INSTRUCTIONS
Kidney Stones  Kidney stones (urolithiasis) are deposits that form inside your kidneys. The intense pain is caused by the stone moving through the urinary tract. When the stone moves, the ureter goes into spasm around the stone. The stone is usually passed in the urine.   CAUSES   · A disorder that makes certain neck glands produce too much parathyroid hormone (primary hyperparathyroidism).  · A buildup of uric acid crystals, similar to gout in your joints.  · Narrowing (stricture) of the ureter.  · A kidney obstruction present at birth (congenital obstruction).  · Previous surgery on the kidney or ureters.  · Numerous kidney infections.  SYMPTOMS   1. Feeling sick to your stomach (nauseous).  2. Throwing up (vomiting).  3. Blood in the urine (hematuria).  4. Pain that usually spreads (radiates) to the groin.  5. Frequency or urgency of urination.  DIAGNOSIS   1. Taking a history and physical exam.  2. Blood or urine tests.  3. CT scan.  4. Occasionally, an examination of the inside of the urinary bladder (cystoscopy) is performed.  TREATMENT   1. Observation.  2. Increasing your fluid intake.  3. Extracorporeal shock wave lithotripsy--This is a noninvasive procedure that uses shock waves to break up kidney stones.  4. Surgery may be needed if you have severe pain or persistent obstruction. There are various surgical procedures. Most of the procedures are performed with the use of small instruments. Only small incisions are needed to accommodate these instruments, so recovery time is minimized.  The size, location, and chemical composition are all important variables that will determine the proper choice of action for you. Talk to your health care provider to better understand your situation so that you will minimize the risk of injury to yourself and your kidney.   HOME CARE INSTRUCTIONS   1. Drink enough water and fluids to keep your urine clear or pale yellow. This will help you to pass the stone or stone  fragments.  2. Strain all urine through the provided strainer. Keep all particulate matter and stones for your health care provider to see. The stone causing the pain may be as small as a grain of salt. It is very important to use the strainer each and every time you pass your urine. The collection of your stone will allow your health care provider to analyze it and verify that a stone has actually passed. The stone analysis will often identify what you can do to reduce the incidence of recurrences.  3. Only take over-the-counter or prescription medicines for pain, discomfort, or fever as directed by your health care provider.  4. Make a follow-up appointment with your health care provider as directed.  5. Get follow-up X-rays if required. The absence of pain does not always mean that the stone has passed. It may have only stopped moving. If the urine remains completely obstructed, it can cause loss of kidney function or even complete destruction of the kidney. It is your responsibility to make sure X-rays and follow-ups are completed. Ultrasounds of the kidney can show blockages and the status of the kidney. Ultrasounds are not associated with any radiation and can be performed easily in a matter of minutes.  SEEK MEDICAL CARE IF:  · You experience pain that is progressive and unresponsive to any pain medicine you have been prescribed.  SEEK IMMEDIATE MEDICAL CARE IF:   · Pain cannot be controlled with the prescribed medicine.  · You have a fever or shaking chills.  · The severity or intensity of pain increases over 18 hours and is not relieved by pain medicine.  · You develop a new onset of abdominal pain.  · You feel faint or pass out.  · You are unable to urinate.  MAKE SURE YOU:   · Understand these instructions.  · Will watch your condition.  · Will get help right away if you are not doing well or get worse.     This information is not intended to replace advice given to you by your health care provider. Make  sure you discuss any questions you have with your health care provider.     Document Released: 12/18/2006 Document Revised: 01/08/2016 Document Reviewed: 05/21/2014  [employer] Interactive Patient Education ©2016 [employer]    Ureteral Stent Implantation, Care After  Refer to this sheet in the next few weeks. These instructions provide you with information on caring for yourself after your procedure. Your health care provider may also give you more specific instructions. Your treatment has been planned according to current medical practices, but problems sometimes occur. Call your health care provider if you have any problems or questions after your procedure.  WHAT TO EXPECT AFTER THE PROCEDURE  You should be back to normal activity within 48 hours after the procedure. Nausea and vomiting may occur and are commonly the result of anesthesia.  It is common to experience sharp pain in the back or lower abdomen and penis with voiding. This is caused by movement of the ends of the stent with the act of urinating. It usually goes away within minutes after you have stopped urinating.  HOME CARE INSTRUCTIONS  Make sure to drink plenty of fluids. You may have small amounts of bleeding, causing your urine to be red. This is normal. Certain movements may trigger pain or a feeling that you need to urinate. You may be given medicines to prevent infection or bladder spasms. Be sure to take all medicines as directed. Only take over-the-counter or prescription medicines for pain, discomfort, or fever as directed by your health care provider. Do not take aspirin, as this can make bleeding worse.  Your stent will be left in until the blockage is resolved. This may take 2 weeks or longer, depending on the reason for stent implantation. You may have an X-ray exam to make sure your ureter is open and that the stent has not moved out of position (migrated). The stent can be removed by your health care provider in the office. Medicines  may be given for comfort while the stent is being removed. Be sure to keep all follow-up appointments so your health care provider can check that you are healing properly.  SEEK MEDICAL CARE IF:  · You experience increasing pain.  · Your pain medicine is not working.  SEEK IMMEDIATE MEDICAL CARE IF:  6. Your urine is dark red or has blood clots.  7. You are leaking urine (incontinent).  8. You have a fever, chills, feeling sick to your stomach (nausea), or vomiting.  9. Your pain is not relieved by pain medicine.  10. The end of the stent comes out of the urethra.  11. You are unable to urinate.     This information is not intended to replace advice given to you by your health care provider. Make sure you discuss any questions you have with your health care provider.     Document Released: 08/20/2014 Document Revised: 12/23/2014 Document Reviewed: 08/20/2014  [employer] Interactive Patient Education ©2016 [employer]    Urine Strainer  This strainer is used to catch or filter out any stones found in your urine. Place the strainer under your urine stream. Save any stones or objects that you find in your urine. Place them in a plastic or glass container to show your caregiver. The stones vary in size - some can be very small, so make sure you check the strainer carefully. Your caregiver may send the stone to the lab. When the results are back, your caregiver may recommend medicines or diet changes.   Document Released: 09/22/2005 Document Revised: 03/11/2013 Document Reviewed: 10/30/2009  [employer]® Patient Information ©2014 [employer].    F/U with Dr. Urias in next couple weeks, can go to post [place] clinic   Return to ER if any significant hematuria, fever, increased flank pain, trouble urinating  Discharge Instructions    Discharged to home by car with relative. Discharged via wheelchair, hospital escort: Yes.  Special equipment needed: Not Applicable    Be sure to schedule a follow-up appointment with your primary  care doctor or any specialists as instructed.     Discharge Plan:   Diet Plan: Discussed  Activity Level: Discussed  Confirmed Follow up Appointment: Patient to Call and Schedule Appointment  Confirmed Symptoms Management: Discussed  Medication Reconciliation Updated: Yes    I understand that a diet low in cholesterol, fat, and sodium is recommended for good health. Unless I have been given specific instructions below for another diet, I accept this instruction as my diet prescription.   Other diet: REGULAR    Special Instructions: None    · Is patient discharged on Warfarin / Coumadin?   No     · Is patient Post Blood Transfusion?  No    Depression / Suicide Risk    As you are discharged from this [facility] facility, it is important to learn how to keep safe from harming yourself.    Recognize the warning signs:  · Abrupt changes in personality, positive or negative- including increase in energy   · Giving away possessions  · Change in eating patterns- significant weight changes-  positive or negative  · Change in sleeping patterns- unable to sleep or sleeping all the time   · Unwillingness or inability to communicate  · Depression  · Unusual sadness, discouragement and loneliness  · Talk of wanting to die  · Neglect of personal appearance   · Rebelliousness- reckless behavior  · Withdrawal from people/activities they love  · Confusion- inability to concentrate     If you or a loved one observes any of these behaviors or has concerns about self-harm, here's what you can do:  · Talk about it- your feelings and reasons for harming yourself  · Remove any means that you might use to hurt yourself (examples: pills, rope, extension cords, firearm)  · Get professional help from the community (Mental Health, Substance Abuse, psychological counseling)  · Do not be alone:Call your Safe Contact- someone whom you trust who will be there for you.  · Call your local CRISIS HOTLINE 280-9318 or 291-445-9752  · Call your local  Children's Mobile Crisis Response Team [place] (307) 775-1360 or www.[place].[place]  · Call the toll free National Suicide Prevention Hotlines   · National Suicide Prevention Lifeline 162-024-YWNE (7090)  · National Hope Line Network 800-SUICIDE (284-0803)

## 2017-08-24 NOTE — PROGRESS NOTES
Radha Combs back from PACU, complaints of L flank pain, declines pain intervention.  Up to bathroom, void x1, bloody no clots.

## 2017-08-25 NOTE — DISCHARGE SUMMARY
Hospital Medicine Discharge Note     Admit Date:  8/21/2017       Discharge Date:   8/24/2017    Primary Care Provider:    Ziggy Urias D.O.    Attending Physician:  Ingrid Benson    Discharge Diagnoses:   Principal Problem (Resolved):    Nephrolithiasis POA: Yes  Active Problems:    Thrombocytopenia (CMS-HCC) POA: Yes  Resolved Problems:    Hydronephrosis POA: Yes    UTI (urinary tract infection) POA: Yes    Metabolic acidosis POA: Yes    Chronic Medical Problems:  Anemia  Back pain  UTI  Migraines  H/O asthma as teenager    Consultants:      Urology Markel Romano    Studies:  CBC unremarkable  K 3.5-->3.9  UA 5-10 wbc 100-150 rbc  UCx negative    Imaging/ Testing:      DX-PORTABLE FLUOROSCOPY < 1 HOUR Is the patient pregnant?: Unknown bchg pending   Final Result         Portable fluoroscopy utilized for 17 seconds.      DX-PORTABLE FLUOROSCOPY < 1 HOUR Is the patient pregnant?: Unknown bchg pending   Final Result         Portable fluoroscopy utilized for 50 seconds.      [id number] VIEW   Final Result      2 fluoroscopic spot films obtained during left ureteral stent placement.      CT-RENAL COLIC EVALUATION(A/P W/O)   Final Result      Left proximal ureteral 5 mm nephrolith causes moderate hydroureteronephrosis and of the adjacent fat stranding      4 mm nonobstructing left nephroliths      Severe hepatic steatosis        Procedures:        8/21 by Dr. Albright:  Cystoscopy and left stent placement.    8/23 by Dr. Jean Baptiste:  Cystoscopy, left ureteroscopy, lasertripsy of distal ureteral    calculus with ureteroscopy of left renal pelvis and removal of left ureteral    stent.    Hospital Summary (Brief Narrative):       For H and P on admission, please refer to full H and P dictated by Dr. Her on 8/21/17 for full details.  Briefly, this is a 37 y.o. female who presented 8/21/2017 with acute left flank pain. Patient started to have pain a few days prior and then the pain over the weekend intensified.  She then developed nausea, vomiting, chills, burning on urination, and severe left flank pain that is 10 out of 10 intensity. She was found to have a kidney stone causing hydroureteronephrosis, and underwent cystoscopy and stent placement.  The stent caused her a lot of pain as well, to where she could not function well with ADLs.  I asked urology to come back to see her, and they took her back to the OR and did lasertripsy as well as removal of the stent.  By the next morning, she had no pain and felt remarkably better, and was eager to go home.    Disposition:   Discharge home    Condition:  Stable    Activity:   As tolerated     Diet:   Regular    Discharge Medications:        None    Follow up appointment details :      Dr. Urias in next couple weeks, can go to post dc clinic first    Instructions:      Return to ER if any significant hematuria, fever, increased flank pain, trouble urinating    Time Spent on Discharge: 38 minutes

## 2017-08-28 ENCOUNTER — PATIENT OUTREACH (OUTPATIENT)
Dept: HEALTH INFORMATION MANAGEMENT | Facility: OTHER | Age: 37
End: 2017-08-28

## 2017-08-28 NOTE — PROGRESS NOTES
8/28/17 at 10:00 AM--Placed discharge outreach phone call to patient s/p hospital discharge 8/24/17.  Left voicemail with my contact information and instructions to return my phone call.    8/28/17 at 1:49 PM--Second attempt at discharge outreach phone call to patient s/p hospital discharge 8/24/17.  Left voicemail with my contact information and instructions to return my phone call.     8/29/17 at 3:14 PM--Received phone call from pt, discharge outreach completed.

## 2019-05-24 NOTE — IP AVS SNAPSHOT
TheraSim Access Code: Activation code not generated  Current TheraSim Status: Active    Mobile Game Dayhart  A secure, online tool to manage your health information     [employer]’s TheraSim® is a secure, online tool that connects you to your personalized health information from the privacy of your home -- day or night - making it very easy for you to manage your healthcare. Once the activation process is completed, you can even access your medical information using the TheraSim nette, which is available for free in the Apple Nette store or Google Play store.     TheraSim provides the following levels of access (as shown below):   My Chart Features   [facility] Primary Care Doctor [facility]  Specialists [facility]  Urgent  Care Non-[facility]  Primary Care  Doctor   Email your healthcare team securely and privately 24/7 X X X X   Manage appointments: schedule your next appointment; view details of past/upcoming appointments X      Request prescription refills. X      View recent personal medical records, including lab and immunizations X X X X   View health record, including health history, allergies, medications X X X X   Read reports about your outpatient visits, procedures, consult and ER notes X X X X   See your discharge summary, which is a recap of your hospital and/or ER visit that includes your diagnosis, lab results, and care plan. X X       How to register for TheraSim:  1. Go to  [email or web address].  2. Click on the Sign Up Now box, which takes you to the New Member Sign Up page. You will need to provide the following information:  a. Enter your TheraSim Access Code exactly as it appears at the top of this page. (You will not need to use this code after you’ve completed the sign-up process. If you do not sign up before the expiration date, you must request a new code.)   b. Enter your date of birth.   c. Enter your home email address.   d. Click Submit, and follow the next screen’s instructions.  3. Create a TheraSim ID. This will  be your [employer] login ID and cannot be changed, so think of one that is secure and easy to remember.  4. Create a [employer] password. You can change your password at any time.  5. Enter your Password Reset Question and Answer. This can be used at a later time if you forget your password.   6. Enter your e-mail address. This allows you to receive e-mail notifications when new information is available in [employer].  7. Click Sign Up. You can now view your health information.    For assistance activating your [employer] account, call [phone]         Attending Attestation (For Attendings USE Only)...

## 2020-06-09 ENCOUNTER — HOSPITAL ENCOUNTER (OUTPATIENT)
Dept: HOSPITAL 8 - CFH | Age: 40
Discharge: HOME | End: 2020-06-09
Attending: OBSTETRICS & GYNECOLOGY
Payer: COMMERCIAL

## 2020-06-09 DIAGNOSIS — N64.4: Primary | ICD-10-CM

## 2020-06-09 DIAGNOSIS — N63.0: ICD-10-CM

## 2020-06-09 PROCEDURE — 77066 DX MAMMO INCL CAD BI: CPT

## 2020-06-09 PROCEDURE — 76642 ULTRASOUND BREAST LIMITED: CPT

## 2020-06-09 PROCEDURE — G0279 TOMOSYNTHESIS, MAMMO: HCPCS

## 2022-02-21 ENCOUNTER — OFFICE VISIT (OUTPATIENT)
Dept: URGENT CARE | Facility: CLINIC | Age: 42
End: 2022-02-21
Payer: COMMERCIAL

## 2022-02-21 VITALS
SYSTOLIC BLOOD PRESSURE: 118 MMHG | RESPIRATION RATE: 16 BRPM | OXYGEN SATURATION: 95 % | BODY MASS INDEX: 19.49 KG/M2 | WEIGHT: 110 LBS | HEART RATE: 83 BPM | HEIGHT: 63 IN | DIASTOLIC BLOOD PRESSURE: 84 MMHG | TEMPERATURE: 98.7 F

## 2022-02-21 DIAGNOSIS — R29.818 IMPAIRED PROPRIOCEPTION: ICD-10-CM

## 2022-02-21 DIAGNOSIS — R20.0 NUMBNESS OF TOES: ICD-10-CM

## 2022-02-21 DIAGNOSIS — M54.50 ACUTE MIDLINE LOW BACK PAIN WITHOUT SCIATICA: ICD-10-CM

## 2022-02-21 PROCEDURE — 96372 THER/PROPH/DIAG INJ SC/IM: CPT | Performed by: PHYSICIAN ASSISTANT

## 2022-02-21 PROCEDURE — 99204 OFFICE O/P NEW MOD 45 MIN: CPT | Mod: 25 | Performed by: PHYSICIAN ASSISTANT

## 2022-02-21 RX ORDER — KETOROLAC TROMETHAMINE 30 MG/ML
30 INJECTION, SOLUTION INTRAMUSCULAR; INTRAVENOUS ONCE
Status: COMPLETED | OUTPATIENT
Start: 2022-02-21 | End: 2022-02-21

## 2022-02-21 RX ORDER — HYDROXYZINE PAMOATE 25 MG/1
CAPSULE ORAL
COMMUNITY
Start: 2022-01-04 | End: 2023-10-05

## 2022-02-21 RX ORDER — BUSPIRONE HYDROCHLORIDE 5 MG/1
5 TABLET ORAL 2 TIMES DAILY
COMMUNITY
Start: 2022-01-30 | End: 2023-10-05

## 2022-02-21 RX ADMIN — KETOROLAC TROMETHAMINE 30 MG: 30 INJECTION, SOLUTION INTRAMUSCULAR; INTRAVENOUS at 15:52

## 2022-02-24 ASSESSMENT — ENCOUNTER SYMPTOMS
BACK PAIN: 1
FALLS: 0
SENSORY CHANGE: 1
TINGLING: 1

## 2022-02-24 NOTE — PROGRESS NOTES
"Subjective     Radha Combs is a 42 y.o. female who presents with Back Pain (X3 weeks, back pain, more muscle pain )            Patient is a 42-year-old female who presents to urgent care with midline low back pain for the last 3 weeks.  Patient felt that this was related to sleeping on a different mattress for 2 nights however after returning to her normal sleeping situation the back pain did not subside.  She reports getting up out of bed and specifically transitions are the worst which she is unable to stand upright in the morning upwards to 30 to 60 minutes secondary to the pain.  Patient does report utilizing over-the-counter pain medication with minimal improvement of symptoms.  She does report radiation of pain laterally however it is more of right toe numbness that she has developed approximately 10 days ago it is more concerning.  Patient does report slight numbness to the adjacent toe as well.  Patient denies any saddle anesthesias or incontinence but does feel bilateral leg weakness.  Patient specifically denies any fevers or body aches.  She continues to deny any fall, trauma or noted injury or any event that would precipitate to back pain.  She denies previous history of same.    Back Pain  This is a new problem. Episode onset: 3 weeks ago. The problem occurs constantly. The problem has been gradually worsening since onset. The pain is present in the lumbar spine, gluteal and sacro-iliac. The quality of the pain is described as stabbing. The pain is moderate. Associated symptoms include tingling.       Review of Systems   Musculoskeletal: Positive for back pain. Negative for falls and joint pain.   Neurological: Positive for tingling and sensory change.   All other systems reviewed and are negative.             Objective     /84   Pulse 83   Temp 37.1 °C (98.7 °F) (Temporal)   Resp 16   Ht 1.6 m (5' 3\")   Wt 49.9 kg (110 lb)   SpO2 95%   BMI 19.49 kg/m²      Physical Exam  Vitals " reviewed.   Constitutional:       General: She is not in acute distress.     Appearance: She is well-developed.   HENT:      Head: Normocephalic and atraumatic.   Eyes:      Conjunctiva/sclera: Conjunctivae normal.      Pupils: Pupils are equal, round, and reactive to light.   Neck:      Trachea: No tracheal deviation.   Cardiovascular:      Rate and Rhythm: Normal rate.   Pulmonary:      Effort: Pulmonary effort is normal.   Musculoskeletal:      Cervical back: Normal range of motion and neck supple.      Comments: Lumbar /Sacral- mild midline tenderness- to L4-S1 region.  There was significant area of perceived pain with getting up out of the chair lower to the spine.  Without skin changes.  DTRs 1+ bilaterally with significant decrease sensation to right great toe without proprioception.  Decreased sensation to second toe.  Toes are warm with cap refill less than 3 seconds.  Dorsalis pedis is 2+.  4/5 motor on the right lower extremity to 5/5 on the left.   Skin:     General: Skin is warm.      Comments: No rash to area exposed during the visit today.    Neurological:      Mental Status: She is alert and oriented to person, place, and time.   Psychiatric:         Behavior: Behavior normal.         Thought Content: Thought content normal.         Judgment: Judgment normal.                             Assessment & Plan        1. Acute midline low back pain without sciatica  - MR-LUMBAR SPINE-W/O; Future  - ketorolac (TORADOL) injection 30 mg    2. Numbness of toes  - MR-LUMBAR SPINE-W/O; Future    3. Impaired proprioception  - MR-LUMBAR SPINE-W/O; Future            Patient is with new midline low back pain with associated numbness of right great toe along with the second digit with significantly impaired proprioception.  Patient is now developing bilateral lower extremity weakness.  I do feel that MR spine is appropriate for further evaluation of radicular symptoms at this time.  Unfortunately due to insurance  purposes patient will need to have done conducted through outside facility.  She is to schedule and alert us of scheduling we can follow-up appropriately.  In clinic the above was given.  I will follow-up with this patient via [employer] as results return.  Patient was given strict ED precautions in the interim.  Appropriate PPE worn at all times by provider.     Side effects of OTC or prescribed medications discussed.     DDX, Supportive care, and indications for immediate follow-up discussed with patient.    Instructed to return to clinic or nearest emergency department if we are not available for any change in condition, further concerns, or worsening of symptoms.    The patient and/or guardian demonstrated a good understanding and agreed with the treatment plan.    Please note that this dictation was created using voice recognition software. I have made every reasonable attempt to correct obvious errors, but I expect that there are errors of grammar and possibly content that I did not discover before finalizing the note.

## 2022-03-12 ENCOUNTER — TELEPHONE (OUTPATIENT)
Dept: URGENT CARE | Facility: CLINIC | Age: 42
End: 2022-03-12
Payer: COMMERCIAL

## 2022-03-14 ENCOUNTER — TELEPHONE (OUTPATIENT)
Dept: URGENT CARE | Facility: CLINIC | Age: 42
End: 2022-03-14
Payer: COMMERCIAL

## 2022-03-15 NOTE — TELEPHONE ENCOUNTER
Called [place] [facility] at [address] [place] [address] , [place] [address], [facility] to fax us the MRI results  done on 03/12/22

## 2023-09-24 ENCOUNTER — APPOINTMENT (OUTPATIENT)
Dept: RADIOLOGY | Facility: MEDICAL CENTER | Age: 43
End: 2023-09-24
Attending: STUDENT IN AN ORGANIZED HEALTH CARE EDUCATION/TRAINING PROGRAM
Payer: COMMERCIAL

## 2023-09-24 ENCOUNTER — HOSPITAL ENCOUNTER (EMERGENCY)
Facility: MEDICAL CENTER | Age: 43
End: 2023-09-24
Attending: STUDENT IN AN ORGANIZED HEALTH CARE EDUCATION/TRAINING PROGRAM
Payer: COMMERCIAL

## 2023-09-24 VITALS
BODY MASS INDEX: 20.16 KG/M2 | RESPIRATION RATE: 16 BRPM | HEART RATE: 76 BPM | DIASTOLIC BLOOD PRESSURE: 79 MMHG | OXYGEN SATURATION: 96 % | HEIGHT: 63 IN | SYSTOLIC BLOOD PRESSURE: 125 MMHG | WEIGHT: 113.76 LBS | TEMPERATURE: 97.8 F

## 2023-09-24 DIAGNOSIS — R55 VASOVAGAL SYNCOPE: ICD-10-CM

## 2023-09-24 DIAGNOSIS — S01.511A LIP LACERATION, INITIAL ENCOUNTER: ICD-10-CM

## 2023-09-24 DIAGNOSIS — J34.2 DEVIATED SEPTUM: ICD-10-CM

## 2023-09-24 DIAGNOSIS — S01.21XA LACERATION OF NOSE, INITIAL ENCOUNTER: ICD-10-CM

## 2023-09-24 LAB
ALBUMIN SERPL BCP-MCNC: 4.2 G/DL (ref 3.2–4.9)
ALBUMIN/GLOB SERPL: 1.6 G/DL
ALP SERPL-CCNC: 37 U/L (ref 30–99)
ALT SERPL-CCNC: 25 U/L (ref 2–50)
ANION GAP SERPL CALC-SCNC: 14 MMOL/L (ref 7–16)
AST SERPL-CCNC: 32 U/L (ref 12–45)
BASOPHILS # BLD AUTO: 0.3 % (ref 0–1.8)
BASOPHILS # BLD: 0.03 K/UL (ref 0–0.12)
BILIRUB SERPL-MCNC: 0.2 MG/DL (ref 0.1–1.5)
BUN SERPL-MCNC: 14 MG/DL (ref 8–22)
CALCIUM ALBUM COR SERPL-MCNC: 8.8 MG/DL (ref 8.5–10.5)
CALCIUM SERPL-MCNC: 9 MG/DL (ref 8.4–10.2)
CHLORIDE SERPL-SCNC: 102 MMOL/L (ref 96–112)
CO2 SERPL-SCNC: 21 MMOL/L (ref 20–33)
CREAT SERPL-MCNC: 0.5 MG/DL (ref 0.5–1.4)
EOSINOPHIL # BLD AUTO: 0.08 K/UL (ref 0–0.51)
EOSINOPHIL NFR BLD: 0.9 % (ref 0–6.9)
ERYTHROCYTE [DISTWIDTH] IN BLOOD BY AUTOMATED COUNT: 42.1 FL (ref 35.9–50)
GFR SERPLBLD CREATININE-BSD FMLA CKD-EPI: 118 ML/MIN/1.73 M 2
GLOBULIN SER CALC-MCNC: 2.6 G/DL (ref 1.9–3.5)
GLUCOSE SERPL-MCNC: 107 MG/DL (ref 65–99)
HCT VFR BLD AUTO: 39.8 % (ref 37–47)
HGB BLD-MCNC: 13.9 G/DL (ref 12–16)
IMM GRANULOCYTES # BLD AUTO: 0.03 K/UL (ref 0–0.11)
IMM GRANULOCYTES NFR BLD AUTO: 0.3 % (ref 0–0.9)
LYMPHOCYTES # BLD AUTO: 1.81 K/UL (ref 1–4.8)
LYMPHOCYTES NFR BLD: 21.1 % (ref 22–41)
MCH RBC QN AUTO: 33.9 PG (ref 27–33)
MCHC RBC AUTO-ENTMCNC: 34.9 G/DL (ref 32.2–35.5)
MCV RBC AUTO: 97.1 FL (ref 81.4–97.8)
MONOCYTES # BLD AUTO: 0.55 K/UL (ref 0–0.85)
MONOCYTES NFR BLD AUTO: 6.4 % (ref 0–13.4)
NEUTROPHILS # BLD AUTO: 6.08 K/UL (ref 1.82–7.42)
NEUTROPHILS NFR BLD: 71 % (ref 44–72)
NRBC # BLD AUTO: 0 K/UL
NRBC BLD-RTO: 0 /100 WBC (ref 0–0.2)
PLATELET # BLD AUTO: 240 K/UL (ref 164–446)
PMV BLD AUTO: 8.5 FL (ref 9–12.9)
POTASSIUM SERPL-SCNC: 3.9 MMOL/L (ref 3.6–5.5)
PROT SERPL-MCNC: 6.8 G/DL (ref 6–8.2)
RBC # BLD AUTO: 4.1 M/UL (ref 4.2–5.4)
SODIUM SERPL-SCNC: 137 MMOL/L (ref 135–145)
WBC # BLD AUTO: 8.6 K/UL (ref 4.8–10.8)

## 2023-09-24 PROCEDURE — 85025 COMPLETE CBC W/AUTO DIFF WBC: CPT

## 2023-09-24 PROCEDURE — 700102 HCHG RX REV CODE 250 W/ 637 OVERRIDE(OP): Performed by: STUDENT IN AN ORGANIZED HEALTH CARE EDUCATION/TRAINING PROGRAM

## 2023-09-24 PROCEDURE — 90715 TDAP VACCINE 7 YRS/> IM: CPT | Performed by: STUDENT IN AN ORGANIZED HEALTH CARE EDUCATION/TRAINING PROGRAM

## 2023-09-24 PROCEDURE — A9270 NON-COVERED ITEM OR SERVICE: HCPCS | Performed by: STUDENT IN AN ORGANIZED HEALTH CARE EDUCATION/TRAINING PROGRAM

## 2023-09-24 PROCEDURE — 80053 COMPREHEN METABOLIC PANEL: CPT

## 2023-09-24 PROCEDURE — 700101 HCHG RX REV CODE 250: Performed by: STUDENT IN AN ORGANIZED HEALTH CARE EDUCATION/TRAINING PROGRAM

## 2023-09-24 PROCEDURE — 70450 CT HEAD/BRAIN W/O DYE: CPT

## 2023-09-24 PROCEDURE — 90471 IMMUNIZATION ADMIN: CPT

## 2023-09-24 PROCEDURE — 70486 CT MAXILLOFACIAL W/O DYE: CPT

## 2023-09-24 PROCEDURE — 99284 EMERGENCY DEPT VISIT MOD MDM: CPT

## 2023-09-24 PROCEDURE — 700111 HCHG RX REV CODE 636 W/ 250 OVERRIDE (IP): Performed by: STUDENT IN AN ORGANIZED HEALTH CARE EDUCATION/TRAINING PROGRAM

## 2023-09-24 RX ORDER — ACETAMINOPHEN 325 MG/1
650 TABLET ORAL ONCE
Status: COMPLETED | OUTPATIENT
Start: 2023-09-24 | End: 2023-09-24

## 2023-09-24 RX ORDER — OXYCODONE HYDROCHLORIDE 5 MG/1
5 TABLET ORAL ONCE
Status: COMPLETED | OUTPATIENT
Start: 2023-09-24 | End: 2023-09-24

## 2023-09-24 RX ORDER — OXYCODONE HYDROCHLORIDE 5 MG/1
5 TABLET ORAL EVERY 6 HOURS PRN
Qty: 6 TABLET | Refills: 0 | Status: SHIPPED | OUTPATIENT
Start: 2023-09-24 | End: 2023-09-27

## 2023-09-24 RX ORDER — SODIUM CHLORIDE 9 MG/ML
1000 INJECTION, SOLUTION INTRAVENOUS ONCE
Status: DISCONTINUED | OUTPATIENT
Start: 2023-09-24 | End: 2023-09-24 | Stop reason: HOSPADM

## 2023-09-24 RX ADMIN — Medication 3 ML: at 04:15

## 2023-09-24 RX ADMIN — CLOSTRIDIUM TETANI TOXOID ANTIGEN (FORMALDEHYDE INACTIVATED), CORYNEBACTERIUM DIPHTHERIAE TOXOID ANTIGEN (FORMALDEHYDE INACTIVATED), BORDETELLA PERTUSSIS TOXOID ANTIGEN (GLUTARALDEHYDE INACTIVATED), BORDETELLA PERTUSSIS FILAMENTOUS HEMAGGLUTININ ANTIGEN (FORMALDEHYDE INACTIVATED), BORDETELLA PERTUSSIS PERTACTIN ANTIGEN, AND BORDETELLA PERTUSSIS FIMBRIAE 2/3 ANTIGEN 0.5 ML: 5; 2; 2.5; 5; 3; 5 INJECTION, SUSPENSION INTRAMUSCULAR at 05:43

## 2023-09-24 RX ADMIN — ACETAMINOPHEN 650 MG: 325 TABLET ORAL at 04:30

## 2023-09-24 RX ADMIN — OXYCODONE 5 MG: 5 TABLET ORAL at 05:30

## 2023-09-24 ASSESSMENT — LIFESTYLE VARIABLES
CONSUMPTION TOTAL: NEGATIVE
HAVE PEOPLE ANNOYED YOU BY CRITICIZING YOUR DRINKING: NO
ON A TYPICAL DAY WHEN YOU DRINK ALCOHOL HOW MANY DRINKS DO YOU HAVE: 0
TOTAL SCORE: 0
EVER HAD A DRINK FIRST THING IN THE MORNING TO STEADY YOUR NERVES TO GET RID OF A HANGOVER: NO
HOW MANY TIMES IN THE PAST YEAR HAVE YOU HAD 5 OR MORE DRINKS IN A DAY: 0
AVERAGE NUMBER OF DAYS PER WEEK YOU HAVE A DRINK CONTAINING ALCOHOL: 0
EVER FELT BAD OR GUILTY ABOUT YOUR DRINKING: NO
DO YOU DRINK ALCOHOL: NO
TOTAL SCORE: 0
TOTAL SCORE: 0
HAVE YOU EVER FELT YOU SHOULD CUT DOWN ON YOUR DRINKING: NO

## 2023-09-24 NOTE — ED PROVIDER NOTES
ED Provider Note    CHIEF COMPLAINT  Chief Complaint   Patient presents with    Laceration     Pt got up to the bathroom to have diarrhea and pt stated she got really dizzy and sweaty and then woke up on the floor. Pt believes she hit her face on the sink. Pt has a laceration to the bridge of her nose. Pt states she feels like blood is going down her throat. Pt also states she has a laceration on the inside of her lip.     Dizziness       HPI/ROS  LIMITATION TO HISTORY   Select: : None    Radha Combs is a 43 y.o. female who presents with laceration to nose, facial trauma.  Patient states she got up to go to the bathroom because she woke up and was having some stomach discomfort feeling like she had to have a bowel movement.  When she [place] diarrhea she states she got very dizzy, sweaty, and woke up on the floor.  She thinks she hit her face on the sink.  She was feeling like she had blood going down her throat earlier but states this is stopped.  She reports difficulty breathing out of both naris.  Also reports an laceration on the inside of her upper lip and reports pain in the face under the nose.  She denies any chest pain or shortness of breath.  States she has a history of passing out previously and similar syncopal events in the past.    PAST MEDICAL HISTORY  Past Medical History:   Diagnosis Date    Bladder infection 2003    Back pain 1996    Blood transfusion 1985    Kidney disease 1985    Anemia     ASTHMA     history of as a teenager    Gynecological disorder     painful periods; irregular periods    Migraine         SURGICAL HISTORY  Past Surgical History:   Procedure Laterality Date    URETEROSCOPY Left 8/23/2017    Procedure: URETEROSCOPY - WITH UTERAL STENT REMOVAL ;  Surgeon: Bravo Jean Baptiste M.D.;  Location: [facility];  Service:     LASERTRIPSY Left 8/23/2017    Procedure: LASERTRIPSY;  Surgeon: Bravo Jean Baptiste M.D.;  Location: [facility];   "Service:     CYSTOSCOPY STENT PLACEMENT Left 8/21/2017    Procedure: CYSTOSCOPY STENT PLACEMENT;  Surgeon: Aj Albright M.D.;  Location: SURGERY [facility];  Service:     PELVISCOPY Bilateral 2/9/2016    Procedure: PELVISCOPY exploratory with bilateral saplingectomy ;  Surgeon: Yarely Bishop M.D.;  Location: SURGERY SAME DAY [facility];  Service:     HYSTEROSCOPY THERMAL ABLATION N/A 2/9/2016    Procedure: HYSTEROSCOPY hydro THERMAL ABLATION;  Surgeon: Yarely Bishop M.D.;  Location: SURGERY SAME DAY [facility];  Service:     OTHER      wisdom teeth        FAMILY HISTORY  Family History   Problem Relation Age of Onset    Allergies Mother     Allergies Sister     Cancer Maternal Grandmother     Lung Disease Paternal Grandfather        SOCIAL HISTORY       CURRENT MEDICATIONS  Home Medications    Medication Sig Taking? Last Dose Authorizing Provider   oxyCODONE immediate-release (ROXICODONE) 5 MG Tab Take 1 Tablet by mouth every 6 hours as needed for Severe Pain for up to 3 days. Yes  Tish Mendez M.D.   busPIRone (BUSPAR) 5 MG tablet Take 5 mg by mouth 2 times a day.   Physician Outpatient   hydrOXYzine pamoate (VISTARIL) 25 MG Cap TAKE 1-2 CAPSULE BY MOUTH FOUR TIMES A DAY TAKE 1-2 CAPSULE BY MOUTH FOUR TIMES A DAY AS NEEDED   Physician Outpatient   sertraline (ZOLOFT) 50 MG Tab Take 50 mg by mouth every day.   Physician Outpatient       ALLERGIES  No Known Allergies    PHYSICAL EXAM  /89   Pulse 78   Temp 36.6 °C (97.9 °F) (Temporal)   Resp 18   Ht 1.6 m (5' 3\")   Wt 51.6 kg (113 lb 12.1 oz)   SpO2 97%   Constitutional: Alert in no apparent distress.  HENT: No signs of trauma, Bilateral external ears normal, laceration to bridge of nose, dried blood in both naris, no septal hematoma, patient states unable to breathe out of either nare when occluded.  Laceration to the inside of the upper lip 0.5 cm.  No dental pain.  Posterior oropharynx with no active bleeding, " dried blood seen  Eyes: Pupils are equal and reactive, Conjunctiva normal, Non-icteric.   Neck: Normal range of motion, No midline tenderness, Supple, No stridor.   Cardiovascular: Regular rate and rhythm, no murmurs.   Thorax & Lungs: Normal breath sounds, No respiratory distress, No wheezing  Abdomen: Soft, No tenderness, No peritoneal signs, No masses, No pulsatile masses.   Skin: Warm, Dry, No erythema, No rash.   Extremities: Intact distal pulses, No edema, No tenderness, No cyanosis  Neurologic: Alert , Normal motor function, Normal speech, No focal deficits noted.   Psychiatric: Affect normal, Judgment normal, Mood normal.         DIAGNOSTIC STUDIES / PROCEDURES    LABS & EKG  I have independently interpreted this EKG     Results for orders placed or performed during the hospital encounter of 09/24/23   CBC WITH DIFFERENTIAL   Result Value Ref Range    WBC 8.6 4.8 - 10.8 K/uL    RBC 4.10 (L) 4.20 - 5.40 M/uL    Hemoglobin 13.9 12.0 - 16.0 g/dL    Hematocrit 39.8 37.0 - 47.0 %    MCV 97.1 81.4 - 97.8 fL    MCH 33.9 (H) 27.0 - 33.0 pg    MCHC 34.9 32.2 - 35.5 g/dL    RDW 42.1 35.9 - 50.0 fL    Platelet Count 240 164 - 446 K/uL    MPV 8.5 (L) 9.0 - 12.9 fL    Neutrophils-Polys 71.00 44.00 - 72.00 %    Lymphocytes 21.10 (L) 22.00 - 41.00 %    Monocytes 6.40 0.00 - 13.40 %    Eosinophils 0.90 0.00 - 6.90 %    Basophils 0.30 0.00 - 1.80 %    Immature Granulocytes 0.30 0.00 - 0.90 %    Nucleated RBC 0.00 0.00 - 0.20 /100 WBC    Neutrophils (Absolute) 6.08 1.82 - 7.42 K/uL    Lymphs (Absolute) 1.81 1.00 - 4.80 K/uL    Monos (Absolute) 0.55 0.00 - 0.85 K/uL    Eos (Absolute) 0.08 0.00 - 0.51 K/uL    Baso (Absolute) 0.03 0.00 - 0.12 K/uL    Immature Granulocytes (abs) 0.03 0.00 - 0.11 K/uL    NRBC (Absolute) 0.00 K/uL   COMP METABOLIC PANEL   Result Value Ref Range    Sodium 137 135 - 145 mmol/L    Potassium 3.9 3.6 - 5.5 mmol/L    Chloride 102 96 - 112 mmol/L    Co2 21 20 - 33 mmol/L    Anion Gap 14.0 7.0 - 16.0     Glucose 107 (H) 65 - 99 mg/dL    Bun 14 8 - 22 mg/dL    Creatinine 0.50 0.50 - 1.40 mg/dL    Calcium 9.0 8.4 - 10.2 mg/dL    Correct Calcium 8.8 8.5 - 10.5 mg/dL    AST(SGOT) 32 12 - 45 U/L    ALT(SGPT) 25 2 - 50 U/L    Alkaline Phosphatase 37 30 - 99 U/L    Total Bilirubin 0.2 0.1 - 1.5 mg/dL    Albumin 4.2 3.2 - 4.9 g/dL    Total Protein 6.8 6.0 - 8.2 g/dL    Globulin 2.6 1.9 - 3.5 g/dL    A-G Ratio 1.6 g/dL   ESTIMATED GFR   Result Value Ref Range    GFR (CKD-EPI) 118 >60 mL/min/1.73 m 2       RADIOLOGY  I have independently interpreted the diagnostic imaging associated with this visit and am waiting the final reading from the radiologist.   My preliminary interpretation is a follows: CT max face with no obvious nasal bone fracture, opacification in the sinuses and nasal passages present    Radiologist interpretation:   CT-HEAD W/O   Final Result      1.  Head CT without contrast within normal limits. No evidence of acute cerebral infarction, hemorrhage or mass lesion.   2.  There is extensive opacity of the sinuses.         CT-MAXILLOFACIAL W/O PLUS RECONS   Final Result         1.  No acute displaced facial bone or orbital fracture.   2.  There is marked deviation of the septum to the left with potential injury versus chronic change.   3. There is opacity in the ethmoid air cells and nasal passages as well as nasopharynx which could represent some chronic sinus disease or blood products.          COURSE & MEDICAL DECISION MAKING    ED Observation Status? No; Patient does not meet criteria for ED Observation.     INITIAL ASSESSMENT, COURSE AND PLAN  Care Narrative: 43-year-old female presenting with nose laceration and facial trauma after syncopized and when going to have diarrhea at home.  Patient with normal vital signs here.  Similar symptoms and history is consistent with likely vasovagal event.  EKG shows no evidence of ischemia or dysrhythmia.  She does have a laceration on her nose that will require  repair.  Will obtain CT of the head and max face to evaluate for underlying fractures, deviated septum.  We will also check basic labs.  IV fluids ordered for rehydration given diarrhea reported, but patient refused IV and IV fluids.    5:07 AM  CT shows no intracranial hemorrhage or skull fracture.  No facial fractures but does show a deviated septum.  We will discharge the patient with referral for ENT for evaluation of her deviated septum.  She continues to have difficulty breathing out of both naris, not the deviated side particularly.  Given the congestion seen on CT we will have her do saline rinses in both sides over the next few days to help clear any blood products or congestion that is present.  No indication for antibiotics as no evidence of open fracture.    Patient's labs with no significant electrolyte abnormalities, normal renal function and no anemia.  History of syncopal event is most consistent with vasovagal syncope, likely triggered by sensations of needing to stool.  Will discharge.  Will update tetanus as last tetanus 2015.    Laceration Repair Procedure    Indication: Laceration    Location/Description: nose    Procedure: The patient was placed in the appropriate position and anesthesia around the laceration was obtained by infiltration using LET gel. The area was then irrigated with normal saline. The laceration was closed with steri strips. There were no additional lacerations requiring repair.     Total repaired wound length: 2 cm.     Other Items: None    The patient tolerated the procedure well.    Complications: None      ADDITIONAL PROBLEM LIST    Deviated septum  Laceration nose  Upper lip laceration  Facial pain  Vasovagal syncope    DISPOSITION AND DISCUSSIONS    Escalation of care considered, and ultimately not performed:after discussion with the patient / family, they have elected to decline an escalation in care and IV fluids    Barriers to care at this time, including but not  limited to: Patient does not have established PCP.     Decision tools and prescription drugs considered including, but not limited to: Antibiotics no indication and Pain Medications oxycodone .    Discharged home in stable condition    FINAL DIAGNOSIS  1. Deviated septum Acute Referral to ENT      2. Laceration of nose, initial encounter  oxyCODONE immediate-release (ROXICODONE) 5 MG Tab      3. Lip laceration, initial encounter  oxyCODONE immediate-release (ROXICODONE) 5 MG Tab      4. Vasovagal syncope  Referral to establish with [facility] PCP            Electronically signed by: Tish Mendez M.D., 09/24/23 3:47 AM

## 2023-09-24 NOTE — DISCHARGE INSTRUCTIONS
Use saline spray in both nostrils twice daily for the next week to help unclog her nostrils.  We are placing referral to ENT to have you follow-up for your deviated septum for repeat evaluation.  Given that right now you are having difficulty breathing out of both sides, more likely from the acute trauma we would still like you to get seen.    Take the following medications for pain/fever at home:  Acetaminophen (Tylenol): Take 650 mg (2 regular strength) every 6 hours. Do not take more than 3,000mg in a 24 hour period.   Ibuprofen: Take 400-600 mg (2-3 regular strength) every 6 hours. Take with food.   Alternate the two medications and you can take one of them every 3 hours.

## 2023-09-24 NOTE — ED TRIAGE NOTES
"Pt ambulated to ED 10 for the followign c/o    Chief Complaint   Patient presents with    Laceration     Pt got up to the bathroom to have diarrhea and pt stated she got really dizzy and sweaty and then woke up on the floor. Pt believes she hit her face on the sink. Pt has a laceration to the bridge of her nose. Pt states she feels like blood is going down her throat. Pt also states she has a laceration on the inside of her lip.     Dizziness     /89   Pulse 78   Temp 36.6 °C (97.9 °F) (Temporal)   Resp 18   Ht 1.6 m (5' 3\")   Wt 51.6 kg (113 lb 12.1 oz)   LMP 09/03/2023 (Approximate)   SpO2 97%   BMI 20.15 kg/m²     "

## 2023-09-29 NOTE — ED NOTES
Patient called regarding questions about discharge instructions. Patient scheduled for follow up with ENT on 11/5. Patient provided additional information regarding dressing. Andrews educated on information Vanessa provided during stay. Patient to follow up with ENT in the morning to discuss further care. Patient invited to return to ED for further evaluation or assistance if needed, or abnormal signs and symptoms seen. Patient verbalized understanding.

## 2023-10-02 ENCOUNTER — APPOINTMENT (OUTPATIENT)
Dept: RADIOLOGY | Facility: MEDICAL CENTER | Age: 43
End: 2023-10-02
Attending: STUDENT IN AN ORGANIZED HEALTH CARE EDUCATION/TRAINING PROGRAM
Payer: COMMERCIAL

## 2023-10-02 ENCOUNTER — HOSPITAL ENCOUNTER (EMERGENCY)
Facility: MEDICAL CENTER | Age: 43
End: 2023-10-02
Attending: STUDENT IN AN ORGANIZED HEALTH CARE EDUCATION/TRAINING PROGRAM
Payer: COMMERCIAL

## 2023-10-02 VITALS
BODY MASS INDEX: 20.12 KG/M2 | OXYGEN SATURATION: 95 % | HEART RATE: 80 BPM | HEIGHT: 63 IN | TEMPERATURE: 98.1 F | WEIGHT: 113.54 LBS | RESPIRATION RATE: 17 BRPM | SYSTOLIC BLOOD PRESSURE: 116 MMHG | DIASTOLIC BLOOD PRESSURE: 83 MMHG

## 2023-10-02 DIAGNOSIS — S22.31XA CLOSED FRACTURE OF ONE RIB OF RIGHT SIDE, INITIAL ENCOUNTER: ICD-10-CM
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
## 2023-10-02 DIAGNOSIS — S16.1XXA STRAIN OF NECK MUSCLE, INITIAL ENCOUNTER: ICD-10-CM

## 2023-10-02 PROCEDURE — 99284 EMERGENCY DEPT VISIT MOD MDM: CPT

## 2023-10-02 PROCEDURE — 71101 X-RAY EXAM UNILAT RIBS/CHEST: CPT | Mod: RT

## 2023-10-02 RX ORDER — POLYETHYLENE GLYCOL 3350 17 G/17G
17 POWDER, FOR SOLUTION ORAL 2 TIMES DAILY
Qty: 14 EACH | Refills: 0 | Status: SHIPPED | OUTPATIENT
Start: 2023-10-02

## 2023-10-02 RX ORDER — ONDANSETRON 4 MG/1
4 TABLET, ORALLY DISINTEGRATING ORAL EVERY 6 HOURS PRN
Qty: 10 TABLET | Refills: 0 | Status: SHIPPED | OUTPATIENT
Start: 2023-10-02

## 2023-10-02 RX ORDER — IBUPROFEN 600 MG/1
600 TABLET ORAL EVERY 6 HOURS PRN
Qty: 30 TABLET | Refills: 0 | Status: SHIPPED | OUTPATIENT
Start: 2023-10-02

## 2023-10-02 RX ORDER — DOCUSATE SODIUM 100 MG/1
100 CAPSULE, LIQUID FILLED ORAL 2 TIMES DAILY
Qty: 60 CAPSULE | Refills: 0 | Status: SHIPPED | OUTPATIENT
Start: 2023-10-02

## 2023-10-02 RX ORDER — LIDOCAINE 4 G/G
1 PATCH TOPICAL EVERY 12 HOURS
Qty: 1 PATCH | Refills: 0 | Status: SHIPPED | OUTPATIENT
Start: 2023-10-02

## 2023-10-02 RX ORDER — ACETAMINOPHEN 500 MG
500-1000 TABLET ORAL EVERY 6 HOURS PRN
Qty: 30 TABLET | Refills: 0 | Status: SHIPPED | OUTPATIENT
Start: 2023-10-02

## 2023-10-02 RX ORDER — OXYCODONE HYDROCHLORIDE 5 MG/1
5 TABLET ORAL EVERY 6 HOURS PRN
Qty: 16 TABLET | Refills: 0 | Status: SHIPPED | OUTPATIENT
Start: 2023-10-02 | End: 2023-10-06

## 2023-10-02 ASSESSMENT — FIBROSIS 4 INDEX: FIB4 SCORE: 1.173333333333333333

## 2023-10-02 NOTE — Clinical Note
Radha Combs was seen and treated in our emergency department on 10/2/2023.  She may return to work on 10/03/2023.  No heavy lifting over 15 pounds for the next 6 weeks     If you have any questions or concerns, please don't hesitate to call.      Dhaval Muller D.O.
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
## 2023-10-03 NOTE — DISCHARGE INSTRUCTIONS
If you develop any difficulty breathing uncontrolled pain fevers shortness of breath return to the ER.  Take Tylenol every 6 hours and ibuprofen every 6 hours that we are taking something every 3 hours as needed for pain.  Use the oxycodone sparingly as needed for breakthrough pain, and be sure to take Colace or MiraLAX to prevent opioid-induced constipation.  If you have nausea associated with the oxycodone use the Zofran as needed. Use the incentive spirometer for the next week if you develop any other new or concerning symptoms return to the ER.

## 2023-10-03 NOTE — ED NOTES
Reviewed discharge instructions and prescriptions x 7 sent to selected Pharmacy w/ pt and visitor, verbalized understanding to information provided including follow up care, return precautions, medications precautions and use of IS.  Pt able to demonstrate correct use of IS.  Narcotic consent signed and placed on chart.  Pt provided w/ work release note by ERP.  Pt denied further questions/concerns.  Pt ambulated from ED w/ family.
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
## 2023-10-05 ENCOUNTER — OFFICE VISIT (OUTPATIENT)
Dept: MEDICAL GROUP | Facility: MEDICAL CENTER | Age: 43
End: 2023-10-05
Attending: STUDENT IN AN ORGANIZED HEALTH CARE EDUCATION/TRAINING PROGRAM
Payer: COMMERCIAL

## 2023-10-05 VITALS
OXYGEN SATURATION: 96 % | BODY MASS INDEX: 19.31 KG/M2 | TEMPERATURE: 98 F | HEIGHT: 63 IN | WEIGHT: 109 LBS | DIASTOLIC BLOOD PRESSURE: 56 MMHG | HEART RATE: 94 BPM | RESPIRATION RATE: 16 BRPM | SYSTOLIC BLOOD PRESSURE: 100 MMHG

## 2023-10-05 DIAGNOSIS — Z11.59 NEED FOR HEPATITIS C SCREENING TEST: ICD-10-CM

## 2023-10-05 DIAGNOSIS — J34.2 DEVIATED SEPTUM: ICD-10-CM

## 2023-10-05 DIAGNOSIS — Z13.6 SCREENING FOR CARDIOVASCULAR CONDITION: ICD-10-CM

## 2023-10-05 DIAGNOSIS — Z00.00 ENCOUNTER FOR MEDICAL EXAMINATION TO ESTABLISH CARE: ICD-10-CM

## 2023-10-05 DIAGNOSIS — S22.31XD CLOSED FRACTURE OF ONE RIB OF RIGHT SIDE WITH ROUTINE HEALING: ICD-10-CM

## 2023-10-05 PROCEDURE — 3078F DIAST BP <80 MM HG: CPT | Performed by: STUDENT IN AN ORGANIZED HEALTH CARE EDUCATION/TRAINING PROGRAM

## 2023-10-05 PROCEDURE — 3074F SYST BP LT 130 MM HG: CPT | Performed by: STUDENT IN AN ORGANIZED HEALTH CARE EDUCATION/TRAINING PROGRAM

## 2023-10-05 PROCEDURE — 99213 OFFICE O/P EST LOW 20 MIN: CPT | Performed by: STUDENT IN AN ORGANIZED HEALTH CARE EDUCATION/TRAINING PROGRAM

## 2023-10-05 ASSESSMENT — FIBROSIS 4 INDEX: FIB4 SCORE: 1.173333333333333333

## 2023-10-05 NOTE — PROGRESS NOTES
"Subjective:     CC:  Diagnoses of Encounter for medical examination to establish care, Closed fracture of one rib of right side with routine healing, Deviated septum, Screening for cardiovascular condition, and Need for hepatitis C screening test were pertinent to this visit.    HISTORY OF THE PRESENT ILLNESS: Patient is a 43 y.o. female. This pleasant patient is here today to establish care and discuss the following    Problem   Closed Fracture of One Rib of Right Side With Routine Healing    This is an acute condition that started less than a week ago.  Since then she has been managing pain with lidocaine patches and as needed oxycodone.  She is working on taking deep breath to expand her lungs.     Deviated Septum    This is a chronic condition.  This was brought up to her recently in the emergency department where she hurt her nose.     Menorrhagia (Resolved)     ROS:   ROS      Objective:     Exam: /56   Pulse 94   Temp 36.7 °C (98 °F) (Temporal)   Resp 16   Ht 1.6 m (5' 3\")   Wt 49.4 kg (109 lb)   SpO2 96%  Body mass index is 19.31 kg/m².    Physical Exam  Vitals reviewed.   Constitutional:       General: She is not in acute distress.     Appearance: She is not toxic-appearing.   HENT:      Head: Normocephalic and atraumatic.      Right Ear: External ear normal.      Left Ear: External ear normal.      Nose:      Comments: 1 cm well-healing laceration on the bridge of the nose  Eyes:      General:         Right eye: No discharge.         Left eye: No discharge.      Extraocular Movements: Extraocular movements intact.      Conjunctiva/sclera: Conjunctivae normal.   Cardiovascular:      Rate and Rhythm: Normal rate and regular rhythm.      Heart sounds: Normal heart sounds. No murmur heard.  Pulmonary:      Effort: Pulmonary effort is normal. No respiratory distress.      Breath sounds: Normal breath sounds. No wheezing or rales.   Skin:     General: Skin is warm and dry.      Comments: " Yellow/purple discoloration on the skin overlying her rib on the right   Neurological:      Mental Status: She is alert.   Psychiatric:         Mood and Affect: Mood normal.         Behavior: Behavior normal.         Thought Content: Thought content normal.         Judgment: Judgment normal.           Assessment & Plan:   43 y.o. female with the following -    1. Encounter for medical examination to establish care  History, problem list, medications and allergies reviewed.  Records requested from previous provider if applicable.    2. Closed fracture of one rib of right side with routine healing  Discussed the importance of taking deep breaths.  Discussed that it can take 4 to 6 weeks for complete healing.  Use oxycodone sparingly.    3. Deviated septum  Offered patient referral to ENT.  For now she is not having any symptoms but will let me know if she develops any nasal congestion or difficulty with breathing    4. Screening for cardiovascular condition  - Lipid Profile; Future    5. Need for hepatitis C screening test  - HEP C VIRUS ANTIBODY; Future    No follow-ups on file.    Please note that this dictation was created using voice recognition software. I have made every reasonable attempt to correct obvious errors, but I expect that there are errors of grammar and possibly content that I did not discover before finalizing the note.

## 2024-03-28 NOTE — CARE PLAN
Problem: Safety  Goal: Will remain free from injury  Outcome: PROGRESSING AS EXPECTED  PT WILL BE FREE FROM INJURY, INSTRUCTION FOR USE OF CALL/BR CALL LIGHT GIVEN.    Problem: Knowledge Deficit  Goal: Knowledge of disease process/condition, treatment plan, diagnostic tests, and medications will improve  Outcome: PROGRESSING AS EXPECTED  PT AND FAMILY MEMBER  UPDATED ON PTS PLAN OF CARE,NURSES COMMUNICATIONS WITH DOCTORS.         migraines

## 2024-12-04 NOTE — TELEPHONE ENCOUNTER
CBC with Automated Differential, Reticulocyte   Count Automated, Ferritin, Iron And total Iron   Binding Capacity, Vitamin B12 And Folate dated 12/3 to Dr. Jones for review.     Labs reviewed by Dr. Jones who advises the patient is iron deficient and B12 is borderline.  Begin oral Iron and sublingual  B12 Daily.     Call placed to patient who has been made aware and is also aware insurance may not cover and in that case to purchase otc.  Understanding verbalized   Caller Name: Radha Combs  Call Back Number: [phone]    How would the patient prefer to be contacted with a response: Phone call OK to leave a detailed message    Patient called and LVM, stating she was instructed to call and notify us when she was able to get an MRI done. Patient stated she had the MRI done today 3/12/22 at [facility]  [phone].

## (undated) DEVICE — TUBE E-T HI-LO CUFF 7.0MM (10EA/PK)

## (undated) DEVICE — CATHETER URET OPEN END 6FR (10EA/BX)

## (undated) DEVICE — BAG URODRAIN WITH TUBING - (20/CA)

## (undated) DEVICE — LACTATED RINGERS INJ 1000 ML - (14EA/CA 60CA/PF)

## (undated) DEVICE — JELLY, KY 2 0Z STERILE

## (undated) DEVICE — SPONGE XRAY 8X4 STERL. 12PL - (10EA/TY 80TY/CA)

## (undated) DEVICE — CATHETER URETHRAL OPEN END AXXCESS (10EA/BX)

## (undated) DEVICE — MASK ANESTHESIA ADULT  - (100/CA)

## (undated) DEVICE — NEPTUNE 4 PORT MANIFOLD - (20/PK)

## (undated) DEVICE — DETERGENT RENUZYME PLUS 10 OZ PACKET (50/BX)

## (undated) DEVICE — SET EXTENSION WITH 2 PORTS (48EA/CA) ***PART #2C8610 IS A SUBSTITUTE*****

## (undated) DEVICE — GLOVE BIOGEL SZ 7.5 SURGICAL PF LTX - (50PR/BX 4BX/CA)

## (undated) DEVICE — PACK CYSTOSCOPY - (14/CA)

## (undated) DEVICE — PROTECTOR ULNA NERVE - (36PR/CA)

## (undated) DEVICE — WATER IRRIG. STER. 1500 ML - (9/CA)

## (undated) DEVICE — KIT ANESTHESIA W/CIRCUIT & 3/LT BAG W/FILTER (20EA/CA)

## (undated) DEVICE — KIT ROOM DECONTAMINATION

## (undated) DEVICE — TUBE CONNECT SUCTION CLEAR 120 X 1/4" (50EA/CA)"

## (undated) DEVICE — HEAD HOLDER JUNIOR/ADULT

## (undated) DEVICE — MEDICINE CUP STERILE 2 OZ - (100/CA)

## (undated) DEVICE — SPONGE GAUZESTER 4 X 4 4PLY - (128PK/CA)

## (undated) DEVICE — SLEEVE, VASO, THIGH, MED

## (undated) DEVICE — GOWN SURGEONS X-LARGE - DISP. (30/CA)

## (undated) DEVICE — GOWN SURGICAL X-LARGE ULTRA - FILM-REINFORCED (20/CA)

## (undated) DEVICE — MASK, LARYNGEAL AIRWAY #4

## (undated) DEVICE — LASER TRAC TIP 200 MIRCON FOR 100 WATT LASER

## (undated) DEVICE — SODIUM CHL. IRRIGATION 0.9% 3000ML (4EA/CA 65CA/PF)

## (undated) DEVICE — ZIPWIRE .038 STRAIGHT BENTSON TIP (5EA/BX)

## (undated) DEVICE — GLOVE BIOGEL SZ 8 SURGICAL PF LTX - (50PR/BX 4BX/CA)

## (undated) DEVICE — SYRINGE DISP. 12 CC LL - (100/BX)

## (undated) DEVICE — GOWN WARMING STANDARD FLEX - (30/CA)

## (undated) DEVICE — SENSOR SPO2 NEO LNCS ADHESIVE (20/BX) SEE USER NOTES

## (undated) DEVICE — ELECTRODE 850 FOAM ADHESIVE - HYDROGEL RADIOTRNSPRNT (50/PK)

## (undated) DEVICE — WATER IRRIG. STER 3000 ML - (4/CA)

## (undated) DEVICE — SYRINGE TOOMEY (50EA/CA)

## (undated) DEVICE — TUBING CLEARLINK DUO-VENT - C-FLO (48EA/CA)

## (undated) DEVICE — COVER FOOT UNIVERSAL DISP. - (25EA/CA)

## (undated) DEVICE — SET IRRIGATION CYSTOSCOPY Y-TYPE L81 IN (20EA/CA)

## (undated) DEVICE — CONTAINER SPECIMEN BAG OR - STERILE 4 OZ W/LID (100EA/CA)

## (undated) DEVICE — SUCTION INSTRUMENT YANKAUER BULBOUS TIP W/O VENT (50EA/CA)